# Patient Record
Sex: MALE | Race: WHITE | NOT HISPANIC OR LATINO | URBAN - METROPOLITAN AREA
[De-identification: names, ages, dates, MRNs, and addresses within clinical notes are randomized per-mention and may not be internally consistent; named-entity substitution may affect disease eponyms.]

---

## 2022-10-10 ENCOUNTER — EMERGENCY (EMERGENCY)
Facility: HOSPITAL | Age: 42
LOS: 0 days | Discharge: HOME | End: 2022-10-10
Attending: STUDENT IN AN ORGANIZED HEALTH CARE EDUCATION/TRAINING PROGRAM

## 2022-10-10 VITALS
RESPIRATION RATE: 18 BRPM | TEMPERATURE: 99 F | DIASTOLIC BLOOD PRESSURE: 91 MMHG | OXYGEN SATURATION: 98 % | HEART RATE: 120 BPM | SYSTOLIC BLOOD PRESSURE: 160 MMHG | WEIGHT: 199.96 LBS

## 2022-10-10 VITALS — HEART RATE: 98 BPM

## 2022-10-10 DIAGNOSIS — R09.89 OTHER SPECIFIED SYMPTOMS AND SIGNS INVOLVING THE CIRCULATORY AND RESPIRATORY SYSTEMS: ICD-10-CM

## 2022-10-10 DIAGNOSIS — Z20.822 CONTACT WITH AND (SUSPECTED) EXPOSURE TO COVID-19: ICD-10-CM

## 2022-10-10 DIAGNOSIS — R11.10 VOMITING, UNSPECIFIED: ICD-10-CM

## 2022-10-10 LAB
ALBUMIN SERPL ELPH-MCNC: 5.4 G/DL — HIGH (ref 3.5–5.2)
ALP SERPL-CCNC: 63 U/L — SIGNIFICANT CHANGE UP (ref 30–115)
ALT FLD-CCNC: 24 U/L — SIGNIFICANT CHANGE UP (ref 0–41)
ANION GAP SERPL CALC-SCNC: 11 MMOL/L — SIGNIFICANT CHANGE UP (ref 7–14)
APTT BLD: 29.7 SEC — SIGNIFICANT CHANGE UP (ref 27–39.2)
AST SERPL-CCNC: 18 U/L — SIGNIFICANT CHANGE UP (ref 0–41)
BASOPHILS # BLD AUTO: 0.06 K/UL — SIGNIFICANT CHANGE UP (ref 0–0.2)
BASOPHILS NFR BLD AUTO: 0.5 % — SIGNIFICANT CHANGE UP (ref 0–1)
BILIRUB SERPL-MCNC: 0.4 MG/DL — SIGNIFICANT CHANGE UP (ref 0.2–1.2)
BLD GP AB SCN SERPL QL: SIGNIFICANT CHANGE UP
BUN SERPL-MCNC: 16 MG/DL — SIGNIFICANT CHANGE UP (ref 10–20)
CALCIUM SERPL-MCNC: 9.7 MG/DL — SIGNIFICANT CHANGE UP (ref 8.4–10.5)
CHLORIDE SERPL-SCNC: 104 MMOL/L — SIGNIFICANT CHANGE UP (ref 98–110)
CO2 SERPL-SCNC: 25 MMOL/L — SIGNIFICANT CHANGE UP (ref 17–32)
CREAT SERPL-MCNC: 0.9 MG/DL — SIGNIFICANT CHANGE UP (ref 0.7–1.5)
EGFR: 111 ML/MIN/1.73M2 — SIGNIFICANT CHANGE UP
EOSINOPHIL # BLD AUTO: 0.06 K/UL — SIGNIFICANT CHANGE UP (ref 0–0.7)
EOSINOPHIL NFR BLD AUTO: 0.5 % — SIGNIFICANT CHANGE UP (ref 0–8)
GLUCOSE SERPL-MCNC: 116 MG/DL — HIGH (ref 70–99)
HCT VFR BLD CALC: 46.9 % — SIGNIFICANT CHANGE UP (ref 42–52)
HGB BLD-MCNC: 16.4 G/DL — SIGNIFICANT CHANGE UP (ref 14–18)
IMM GRANULOCYTES NFR BLD AUTO: 0.3 % — SIGNIFICANT CHANGE UP (ref 0.1–0.3)
INR BLD: 0.97 RATIO — SIGNIFICANT CHANGE UP (ref 0.65–1.3)
LIDOCAIN IGE QN: 22 U/L — SIGNIFICANT CHANGE UP (ref 7–60)
LYMPHOCYTES # BLD AUTO: 1.7 K/UL — SIGNIFICANT CHANGE UP (ref 1.2–3.4)
LYMPHOCYTES # BLD AUTO: 13.9 % — LOW (ref 20.5–51.1)
MCHC RBC-ENTMCNC: 29.4 PG — SIGNIFICANT CHANGE UP (ref 27–31)
MCHC RBC-ENTMCNC: 35 G/DL — SIGNIFICANT CHANGE UP (ref 32–37)
MCV RBC AUTO: 84.1 FL — SIGNIFICANT CHANGE UP (ref 80–94)
MONOCYTES # BLD AUTO: 0.52 K/UL — SIGNIFICANT CHANGE UP (ref 0.1–0.6)
MONOCYTES NFR BLD AUTO: 4.3 % — SIGNIFICANT CHANGE UP (ref 1.7–9.3)
NEUTROPHILS # BLD AUTO: 9.85 K/UL — HIGH (ref 1.4–6.5)
NEUTROPHILS NFR BLD AUTO: 80.5 % — HIGH (ref 42.2–75.2)
NRBC # BLD: 0 /100 WBCS — SIGNIFICANT CHANGE UP (ref 0–0)
PLATELET # BLD AUTO: 333 K/UL — SIGNIFICANT CHANGE UP (ref 130–400)
POTASSIUM SERPL-MCNC: 4.8 MMOL/L — SIGNIFICANT CHANGE UP (ref 3.5–5)
POTASSIUM SERPL-SCNC: 4.8 MMOL/L — SIGNIFICANT CHANGE UP (ref 3.5–5)
PROT SERPL-MCNC: 8 G/DL — SIGNIFICANT CHANGE UP (ref 6–8)
PROTHROM AB SERPL-ACNC: 11.1 SEC — SIGNIFICANT CHANGE UP (ref 9.95–12.87)
RBC # BLD: 5.58 M/UL — SIGNIFICANT CHANGE UP (ref 4.7–6.1)
RBC # FLD: 12.5 % — SIGNIFICANT CHANGE UP (ref 11.5–14.5)
SARS-COV-2 RNA SPEC QL NAA+PROBE: SIGNIFICANT CHANGE UP
SODIUM SERPL-SCNC: 140 MMOL/L — SIGNIFICANT CHANGE UP (ref 135–146)
WBC # BLD: 12.23 K/UL — HIGH (ref 4.8–10.8)
WBC # FLD AUTO: 12.23 K/UL — HIGH (ref 4.8–10.8)

## 2022-10-10 PROCEDURE — 99284 EMERGENCY DEPT VISIT MOD MDM: CPT

## 2022-10-10 RX ORDER — ONDANSETRON 8 MG/1
4 TABLET, FILM COATED ORAL ONCE
Refills: 0 | Status: COMPLETED | OUTPATIENT
Start: 2022-10-10 | End: 2022-10-10

## 2022-10-10 RX ORDER — GLUCAGON INJECTION, SOLUTION 0.5 MG/.1ML
2 INJECTION, SOLUTION SUBCUTANEOUS ONCE
Refills: 0 | Status: COMPLETED | OUTPATIENT
Start: 2022-10-10 | End: 2022-10-10

## 2022-10-10 RX ORDER — GLUCAGON INJECTION, SOLUTION 0.5 MG/.1ML
2 INJECTION, SOLUTION SUBCUTANEOUS ONCE
Refills: 0 | Status: DISCONTINUED | OUTPATIENT
Start: 2022-10-10 | End: 2022-10-10

## 2022-10-10 RX ORDER — SODIUM CHLORIDE 9 MG/ML
1000 INJECTION, SOLUTION INTRAVENOUS ONCE
Refills: 0 | Status: COMPLETED | OUTPATIENT
Start: 2022-10-10 | End: 2022-10-10

## 2022-10-10 RX ADMIN — ONDANSETRON 4 MILLIGRAM(S): 8 TABLET, FILM COATED ORAL at 17:21

## 2022-10-10 RX ADMIN — SODIUM CHLORIDE 1000 MILLILITER(S): 9 INJECTION, SOLUTION INTRAVENOUS at 17:20

## 2022-10-10 RX ADMIN — GLUCAGON INJECTION, SOLUTION 2 MILLIGRAM(S): 0.5 INJECTION, SOLUTION SUBCUTANEOUS at 18:54

## 2022-10-10 RX ADMIN — GLUCAGON INJECTION, SOLUTION 2 MILLIGRAM(S): 0.5 INJECTION, SOLUTION SUBCUTANEOUS at 17:20

## 2022-10-10 NOTE — ED PROVIDER NOTE - NS ED ATTENDING STATEMENT MOD
This was a shared visit with the BERONICA. I reviewed and verified the documentation and independently performed the documented:

## 2022-10-10 NOTE — ED PROVIDER NOTE - PROGRESS NOTE DETAILS
FF: pt given soda and jumped up and down without relief. pt given 2mg of iv glucagon without relief. I spoke with gi fellow who reports cna give pt another 2mg of glucagon wait 15 minutes and then if that fails give another 2mg of glucagon iv and if that fails then they will have to do endoscopy. after second dose of 2mg of iv glucagon pt reports he felt the piece of food stuck move. pt appears comfortable and is tolerating po. pt drank a bottle of smart water since then. pt advised of strict return precautions discussed at bedside. agreeable to dc f/u with pcp.

## 2022-10-10 NOTE — ED ADULT NURSE NOTE - OBJECTIVE STATEMENT
pt c/o of feeling like a piece of chicken is stuck in his throat for about 4 hours - no difficulty breathing speaking in clear sentences

## 2022-10-10 NOTE — ED PROVIDER NOTE - CARE PLAN
1 Principal Discharge DX:	Sensation of foreign body in throat   Principal Discharge DX:	Sensation of foreign body in throat  Assessment and plan of treatment:	plan - carbonated drink reassess, glucagon, gi

## 2022-10-10 NOTE — ED PROVIDER NOTE - NS ED ROS FT
CONST: No fever, chills or bodyaches  EYES: No pain, redness, drainage or visual changes.  ENT: No ear pain or discharge, nasal discharge or congestion. No sore throat. (+) sensation of food stuck in the throat.   CARD: No chest pain, palpitations  RESP: No SOB, cough, hemoptysis. No hx of asthma or COPD  GI: No abdominal pain, N/V/D  : No urinary symptoms  MS: No joint pain, back pain or extremity pain/injury  SKIN: No rashes  NEURO: No headache, dizziness, paresthesias or LOC

## 2022-10-10 NOTE — ED PROVIDER NOTE - OBJECTIVE STATEMENT
40 y/o male with no significant PMH presents to the ED for evaluation of piece of chicken stuck in his chest. pt reports he was eating boneless chicken earlier today and feels like it is now stuck in his chest. pt reports he was vomiting some pieces of chicken at home but still feels it is stuck in his chest and was not tolerating po at home. pt reports pain in his chest with swallowing. pt reports in the past he has had food get stuck in his throat and reports he eats very quickly. pt has not seen a GI specialist yet. pt denies fever, chills, sob, drooling, change in voice, cough, abdominal pain, n/v/d/c, or weakness.

## 2022-10-10 NOTE — ED PROVIDER NOTE - ATTENDING APP SHARED VISIT CONTRIBUTION OF CARE
40 y/o M no reported PMHx presents to ED reporting he feels there a piece of chicken thigh stuck in his chest. Reports foreign body sensation, no vomiting, no drooling. Pt reporting difficulty swallowing at this time.     No fever, sob, palpitations, diaphoresis, cough, headache, dizziness, numbness/tingling, neck pain/stiffness, abdominal pain, diarrhea, constipation, urinary symptoms, trauma, weakness, edema, calf pain or swelling, sick contacts, recent travel or rash.     Vital Signs: I have reviewed the initial vital signs.  Constitutional: Patient in no acute distress.  Integumentary: No rash.  ENT: MMM no FB in pharynx visualized, no pharyngeal erythema or edema   NECK: Supple.   Cardiovascular: RRR, radial pulses 2/4 bilaterally.   Respiratory: Breath sounds CTA b/l, no wheezing or crackles, good air exchange, good resp effort and excursion, no accessory muscle use, no stridor.  Gastrointestinal: Abdomen soft, non-tender, non-distended, no rebound tenderness or guarding, no CVAT.   Musculoskeletal: FROM, no edema, no calf pain/swelling/erythema.  Neurologic: Awake and alert, no FND, follows commands.

## 2022-10-10 NOTE — ED PROVIDER NOTE - CLINICAL SUMMARY MEDICAL DECISION MAKING FREE TEXT BOX
42 y/o M presented w/ foreign body sensation to throat/chest after eating chicken. Pt given carbonated drink and 2mg glucagon IV x2, GI aware of pt, after second dose of glucagon pt able to swallow the piece of stuck food w/ full improvement of sx. Pt able to tolerate PO at this time. Patient to be discharged from ED. Any available test results were discussed with patient and/or family. Verbal instructions given, including instructions to return to ED immediately for any new, worsening, or concerning symptoms. Patient endorsed understanding. Written discharge instructions additionally given, including follow-up plan.

## 2022-10-10 NOTE — ED PROVIDER NOTE - PHYSICAL EXAMINATION
Physical Exam    Vital Signs: I have reviewed the initial vital signs.  Constitutional: well-nourished, appears stated age, no acute distress  Eyes: Conjunctiva pink, Sclera clear  ENT: Oropharynx is clear without lesions. uvula midline. no tonsillar erythema, edema, or exudates. no stridor. no pta. floor of the mouth is soft without pus. no trismus. no drooling. no tripoding.   Cardiovascular: S1 and S2, regular rate, regular rhythm, well-perfused extremities, radial pulses equal and 2+ b/l.   Respiratory: unlabored respiratory effort, clear to auscultation bilaterally no wheezing, rales and rhonchi. pt is speaking full sentences. no accessory muscle use.   Gastrointestinal: soft, non-tender, nondistended abdomen, no pulsatile mass, normal bowl sounds, no rebound, no guarding  Musculoskeletal: supple neck, FROM of b/l upper and lower extremities.   Integumentary: warm, dry, no rash  Neurologic: awake, alert, cranial nerves II-XII grossly intact, extremities’ motor and sensory functions grossly intact. steady gait.   Psychiatric: appropriate mood, appropriate affect

## 2022-10-10 NOTE — ED PROVIDER NOTE - NSFOLLOWUPINSTRUCTIONS_ED_ALL_ED_FT
Esophageal Foreign Body    WHAT YOU NEED TO KNOW:    What is esophageal foreign body? Esophageal foreign body is an object you swallowed that got stuck in your esophagus (throat). Examples include dental work and button batteries. A piece of food or a fish bone can also become stuck in your esophagus.     What increases my risk for esophageal foreign body? Your risk increases if you wear dentures, have trouble swallowing, or have a narrow esophagus. You also have a higher risk if you eat fish that contains small bones.    What are the signs and symptoms of esophageal foreign body?     Pain when you swallow, difficulty swallowing, or a sore throat      Drooling or vomiting       Choking or gagging       Chest pain, abdominal pain, or a feeling that something is in your throat       A cough or noisy breathing     How is esophageal foreign body diagnosed? Your healthcare provider will examine your throat, chest, and abdomen. Tell him what type of object you swallowed and when you swallowed it. Your healthcare provider may use any of the following to find the object:     A barium swallow or other x-rays may be used to check your neck, chest, and abdomen. You will drink thick liquid called barium while healthcare providers take x-rays. Barium helps your esophagus and stomach show up on x-rays.       Laryngoscopy is used to examine the back of your throat. Your healthcare provider will use a light and a mirror. He may insert a tube called a scope to see deep into your throat.       A metal detector may be used to look for coins or other metal objects in your body.       A CT may be used to check for objects in your esophagus or stomach. You may be given contrast liquid to help your esophagus and stomach show up better. Tell the healthcare provider if you have ever had an allergic reaction to contrast liquid.       Endoscopy may be used to see the inside of your digestive system. A scope is a long, bendable tube with a light on the end of it. A camera attached to the scope will take pictures.     How is esophageal foreign body treated? Your healthcare provider may choose to observe you for 24 hours or longer. Most objects pass through the digestive system on their own within 7 to 10 days. Objects that are small or smooth will often pass without a problem. You will need to search for the object every time you have a bowel movement. You may need x-rays from time to time as you wait for the object to come out. If you are in pain or the object is large or sharp, your healthcare provider may remove it. He will look for the object in your throat. He will remove the object if he can see it. He may need to use instruments if it is stuck so far down that he cannot see it. He may do this with any of the following:     Endoscopy may be used to remove the object.       Forceps may be used to grab an object if your healthcare provider can see it in the back of your throat. Forceps also may be used to remove an object during endoscopy.       Bougienage is a procedure used to push the object into your stomach. Your healthcare provider will insert a thin tube into your esophagus to widen it. This may be done if the object is smooth and likely to pass through your digestive system normally.       A balloon catheter may be used to pull the object out of your esophagus. The catheter is a thin tube with a deflated balloon at the end. Your healthcare provider will insert the balloon catheter into your mouth or nose until it goes past the object. He will then inflate the balloon. This procedure may be done if the object is smooth or blunt.       Surgery may be needed if other treatments fail to remove the object.     Call 911 for any of the following:     You have chest or abdominal pain, or shortness of breath.       You are choking.    When should I seek immediate care?     You have a fever.      You have more pain when you swallow.       You have severe vomiting.       Your vomit is bloody.      Your bowel movements are black or bloody.     When should I contact my healthcare provider?     You do not find the object in your bowel movement within 2 or 3 days.      You have questions or concerns about your condition or care.    CARE AGREEMENT:    You have the right to help plan your care. Learn about your health condition and how it may be treated. Discuss treatment options with your healthcare providers to decide what care you want to receive. You always have the right to refuse treatment.

## 2022-10-10 NOTE — ED PROVIDER NOTE - CARE PROVIDER_API CALL
Orlando Velasquez (DO)  Gastroenterology  [address] [place] [id number]  Phone: [phone]  Fax: [phone]  Follow Up Time: 7-10 Days

## 2023-01-23 ENCOUNTER — OUTPATIENT (OUTPATIENT)
Dept: OUTPATIENT SERVICES | Facility: HOSPITAL | Age: 43
LOS: 1 days | Discharge: HOME | End: 2023-01-23
Payer: COMMERCIAL

## 2023-01-23 DIAGNOSIS — R13.10 DYSPHAGIA, UNSPECIFIED: ICD-10-CM

## 2023-01-23 PROCEDURE — 74240 X-RAY XM UPR GI TRC 1CNTRST: CPT | Mod: 26

## 2023-11-18 ENCOUNTER — EMERGENCY (EMERGENCY)
Facility: HOSPITAL | Age: 43
LOS: 0 days | Discharge: HOME | End: 2023-11-18
Attending: EMERGENCY MEDICINE
Payer: COMMERCIAL

## 2023-11-18 VITALS
RESPIRATION RATE: 19 BRPM | OXYGEN SATURATION: 96 % | HEART RATE: 103 BPM | WEIGHT: 184.97 LBS | DIASTOLIC BLOOD PRESSURE: 108 MMHG | SYSTOLIC BLOOD PRESSURE: 168 MMHG | TEMPERATURE: 99 F

## 2023-11-18 DIAGNOSIS — J18.9 PNEUMONIA, UNSPECIFIED ORGANISM: ICD-10-CM

## 2023-11-18 LAB
ALBUMIN SERPL ELPH-MCNC: 4.6 G/DL — SIGNIFICANT CHANGE UP (ref 3.5–5.2)
ALBUMIN SERPL ELPH-MCNC: 4.6 G/DL — SIGNIFICANT CHANGE UP (ref 3.5–5.2)
ALP SERPL-CCNC: 75 U/L — SIGNIFICANT CHANGE UP (ref 30–115)
ALP SERPL-CCNC: 75 U/L — SIGNIFICANT CHANGE UP (ref 30–115)
ALT FLD-CCNC: 12 U/L — SIGNIFICANT CHANGE UP (ref 0–41)
ALT FLD-CCNC: 12 U/L — SIGNIFICANT CHANGE UP (ref 0–41)
ANION GAP SERPL CALC-SCNC: 12 MMOL/L — SIGNIFICANT CHANGE UP (ref 7–14)
ANION GAP SERPL CALC-SCNC: 12 MMOL/L — SIGNIFICANT CHANGE UP (ref 7–14)
AST SERPL-CCNC: 14 U/L — SIGNIFICANT CHANGE UP (ref 0–41)
AST SERPL-CCNC: 14 U/L — SIGNIFICANT CHANGE UP (ref 0–41)
BASOPHILS # BLD AUTO: 0.07 K/UL — SIGNIFICANT CHANGE UP (ref 0–0.2)
BASOPHILS # BLD AUTO: 0.07 K/UL — SIGNIFICANT CHANGE UP (ref 0–0.2)
BASOPHILS NFR BLD AUTO: 0.9 % — SIGNIFICANT CHANGE UP (ref 0–1)
BASOPHILS NFR BLD AUTO: 0.9 % — SIGNIFICANT CHANGE UP (ref 0–1)
BILIRUB SERPL-MCNC: 0.3 MG/DL — SIGNIFICANT CHANGE UP (ref 0.2–1.2)
BILIRUB SERPL-MCNC: 0.3 MG/DL — SIGNIFICANT CHANGE UP (ref 0.2–1.2)
BUN SERPL-MCNC: 15 MG/DL — SIGNIFICANT CHANGE UP (ref 10–20)
BUN SERPL-MCNC: 15 MG/DL — SIGNIFICANT CHANGE UP (ref 10–20)
CALCIUM SERPL-MCNC: 9.8 MG/DL — SIGNIFICANT CHANGE UP (ref 8.4–10.5)
CALCIUM SERPL-MCNC: 9.8 MG/DL — SIGNIFICANT CHANGE UP (ref 8.4–10.5)
CHLORIDE SERPL-SCNC: 105 MMOL/L — SIGNIFICANT CHANGE UP (ref 98–110)
CHLORIDE SERPL-SCNC: 105 MMOL/L — SIGNIFICANT CHANGE UP (ref 98–110)
CO2 SERPL-SCNC: 25 MMOL/L — SIGNIFICANT CHANGE UP (ref 17–32)
CO2 SERPL-SCNC: 25 MMOL/L — SIGNIFICANT CHANGE UP (ref 17–32)
CREAT SERPL-MCNC: 1 MG/DL — SIGNIFICANT CHANGE UP (ref 0.7–1.5)
CREAT SERPL-MCNC: 1 MG/DL — SIGNIFICANT CHANGE UP (ref 0.7–1.5)
EGFR: 98 ML/MIN/1.73M2 — SIGNIFICANT CHANGE UP
EGFR: 98 ML/MIN/1.73M2 — SIGNIFICANT CHANGE UP
EOSINOPHIL # BLD AUTO: 0.62 K/UL — SIGNIFICANT CHANGE UP (ref 0–0.7)
EOSINOPHIL # BLD AUTO: 0.62 K/UL — SIGNIFICANT CHANGE UP (ref 0–0.7)
EOSINOPHIL NFR BLD AUTO: 8.3 % — HIGH (ref 0–8)
EOSINOPHIL NFR BLD AUTO: 8.3 % — HIGH (ref 0–8)
GLUCOSE SERPL-MCNC: 93 MG/DL — SIGNIFICANT CHANGE UP (ref 70–99)
GLUCOSE SERPL-MCNC: 93 MG/DL — SIGNIFICANT CHANGE UP (ref 70–99)
HCT VFR BLD CALC: 41.8 % — LOW (ref 42–52)
HCT VFR BLD CALC: 41.8 % — LOW (ref 42–52)
HGB BLD-MCNC: 15.3 G/DL — SIGNIFICANT CHANGE UP (ref 14–18)
HGB BLD-MCNC: 15.3 G/DL — SIGNIFICANT CHANGE UP (ref 14–18)
IMM GRANULOCYTES NFR BLD AUTO: 0.4 % — HIGH (ref 0.1–0.3)
IMM GRANULOCYTES NFR BLD AUTO: 0.4 % — HIGH (ref 0.1–0.3)
LACTATE SERPL-SCNC: 1.5 MMOL/L — SIGNIFICANT CHANGE UP (ref 0.7–2)
LACTATE SERPL-SCNC: 1.5 MMOL/L — SIGNIFICANT CHANGE UP (ref 0.7–2)
LYMPHOCYTES # BLD AUTO: 2.23 K/UL — SIGNIFICANT CHANGE UP (ref 1.2–3.4)
LYMPHOCYTES # BLD AUTO: 2.23 K/UL — SIGNIFICANT CHANGE UP (ref 1.2–3.4)
LYMPHOCYTES # BLD AUTO: 29.9 % — SIGNIFICANT CHANGE UP (ref 20.5–51.1)
LYMPHOCYTES # BLD AUTO: 29.9 % — SIGNIFICANT CHANGE UP (ref 20.5–51.1)
MCHC RBC-ENTMCNC: 32.3 PG — HIGH (ref 27–31)
MCHC RBC-ENTMCNC: 32.3 PG — HIGH (ref 27–31)
MCHC RBC-ENTMCNC: 36.6 G/DL — SIGNIFICANT CHANGE UP (ref 32–37)
MCHC RBC-ENTMCNC: 36.6 G/DL — SIGNIFICANT CHANGE UP (ref 32–37)
MCV RBC AUTO: 88.2 FL — SIGNIFICANT CHANGE UP (ref 80–94)
MCV RBC AUTO: 88.2 FL — SIGNIFICANT CHANGE UP (ref 80–94)
MONOCYTES # BLD AUTO: 0.63 K/UL — HIGH (ref 0.1–0.6)
MONOCYTES # BLD AUTO: 0.63 K/UL — HIGH (ref 0.1–0.6)
MONOCYTES NFR BLD AUTO: 8.4 % — SIGNIFICANT CHANGE UP (ref 1.7–9.3)
MONOCYTES NFR BLD AUTO: 8.4 % — SIGNIFICANT CHANGE UP (ref 1.7–9.3)
NEUTROPHILS # BLD AUTO: 3.89 K/UL — SIGNIFICANT CHANGE UP (ref 1.4–6.5)
NEUTROPHILS # BLD AUTO: 3.89 K/UL — SIGNIFICANT CHANGE UP (ref 1.4–6.5)
NEUTROPHILS NFR BLD AUTO: 52.1 % — SIGNIFICANT CHANGE UP (ref 42.2–75.2)
NEUTROPHILS NFR BLD AUTO: 52.1 % — SIGNIFICANT CHANGE UP (ref 42.2–75.2)
NRBC # BLD: 0 /100 WBCS — SIGNIFICANT CHANGE UP (ref 0–0)
NRBC # BLD: 0 /100 WBCS — SIGNIFICANT CHANGE UP (ref 0–0)
PLATELET # BLD AUTO: 434 K/UL — HIGH (ref 130–400)
PLATELET # BLD AUTO: 434 K/UL — HIGH (ref 130–400)
PMV BLD: 9 FL — SIGNIFICANT CHANGE UP (ref 7.4–10.4)
PMV BLD: 9 FL — SIGNIFICANT CHANGE UP (ref 7.4–10.4)
POTASSIUM SERPL-MCNC: 4.2 MMOL/L — SIGNIFICANT CHANGE UP (ref 3.5–5)
POTASSIUM SERPL-MCNC: 4.2 MMOL/L — SIGNIFICANT CHANGE UP (ref 3.5–5)
POTASSIUM SERPL-SCNC: 4.2 MMOL/L — SIGNIFICANT CHANGE UP (ref 3.5–5)
POTASSIUM SERPL-SCNC: 4.2 MMOL/L — SIGNIFICANT CHANGE UP (ref 3.5–5)
PROT SERPL-MCNC: 7.2 G/DL — SIGNIFICANT CHANGE UP (ref 6–8)
PROT SERPL-MCNC: 7.2 G/DL — SIGNIFICANT CHANGE UP (ref 6–8)
RBC # BLD: 4.74 M/UL — SIGNIFICANT CHANGE UP (ref 4.7–6.1)
RBC # BLD: 4.74 M/UL — SIGNIFICANT CHANGE UP (ref 4.7–6.1)
RBC # FLD: 12.2 % — SIGNIFICANT CHANGE UP (ref 11.5–14.5)
RBC # FLD: 12.2 % — SIGNIFICANT CHANGE UP (ref 11.5–14.5)
SODIUM SERPL-SCNC: 142 MMOL/L — SIGNIFICANT CHANGE UP (ref 135–146)
SODIUM SERPL-SCNC: 142 MMOL/L — SIGNIFICANT CHANGE UP (ref 135–146)
WBC # BLD: 7.47 K/UL — SIGNIFICANT CHANGE UP (ref 4.8–10.8)
WBC # BLD: 7.47 K/UL — SIGNIFICANT CHANGE UP (ref 4.8–10.8)
WBC # FLD AUTO: 7.47 K/UL — SIGNIFICANT CHANGE UP (ref 4.8–10.8)
WBC # FLD AUTO: 7.47 K/UL — SIGNIFICANT CHANGE UP (ref 4.8–10.8)

## 2023-11-18 PROCEDURE — 83605 ASSAY OF LACTIC ACID: CPT

## 2023-11-18 PROCEDURE — 99283 EMERGENCY DEPT VISIT LOW MDM: CPT

## 2023-11-18 PROCEDURE — 80053 COMPREHEN METABOLIC PANEL: CPT

## 2023-11-18 PROCEDURE — 36415 COLL VENOUS BLD VENIPUNCTURE: CPT

## 2023-11-18 PROCEDURE — 99284 EMERGENCY DEPT VISIT MOD MDM: CPT

## 2023-11-18 PROCEDURE — 85025 COMPLETE CBC W/AUTO DIFF WBC: CPT

## 2023-11-18 RX ORDER — AZITHROMYCIN 500 MG/1
1 TABLET, FILM COATED ORAL
Qty: 1 | Refills: 0
Start: 2023-11-18 | End: 2023-11-22

## 2023-11-18 NOTE — ED PROVIDER NOTE - OBJECTIVE STATEMENT
42-year-old male with no past medical history presenting for evaluation of pneumonia.  Patient was recently diagnosed with pneumonia 1 week ago. Patient completed course of doxycycline yesterday. Patient states that he has been feeling better over the last week. [place] [facility] [place] and was advised to come to ER for further evaluation for pneumonia on cxr. Patient no longer has any cp, sob, fevers, or chills.

## 2023-11-18 NOTE — ED PROVIDER NOTE - MDM PATIENT STATEMENT FOR ADDL TREATMENT
Patient with one or more new problems requiring additional work-up/treatment. gait, locomotion, and balance/muscle strength

## 2023-11-18 NOTE — ED PROVIDER NOTE - ATTENDING APP SHARED VISIT CONTRIBUTION OF CARE
42-year-old male, took doxycycline for pneumonia, seen in urgent care today and told to come to ED because his repeat x-ray still has pneumonia.  Otherwise he feels better compared to previous.  Exam shows alert patient in no distress, HEENT NCAT PERRL, neck supple, throat no exudates, lungs rales left base, RR S1S2, abdomen soft NT +BS, no CCE.

## 2023-11-18 NOTE — ED PROVIDER NOTE - PHYSICAL EXAMINATION
VITAL SIGNS: I have reviewed nursing notes and confirm.  CONSTITUTIONAL: Patient is in no acute distress.  SKIN: Skin exam is warm and dry, no acute rash.  HEAD: Normocephalic; atraumatic.  EYES: PERRL, EOM intact; conjunctiva and sclera clear.  ENT: No nasal discharge; airway clear.   NECK: Supple; non tender.  CARD: S1, S2 normal; no murmurs, gallops, or rubs. Regular rate and rhythm.  RESP: +Rales bilaterally L>R. No respiratory distress.   ABD: Normal bowel sounds; soft; non-distended; non-tender.   EXT: Normal ROM. No edema.  LYMPH: No acute cervical adenopathy.  NEURO: Alert, oriented. Grossly unremarkable. No focal deficits.  PSYCH: Cooperative, appropriate.

## 2023-11-18 NOTE — ED PROVIDER NOTE - CLINICAL SUMMARY MEDICAL DECISION MAKING FREE TEXT BOX
42-year-old male, took doxycycline for pneumonia, here in ED DC was told his repeat chest x-ray still has pneumonia.  Otherwise he feels better.  X-ray of the chest reviewed which showed no opacities.  Labs unremarkable.  Will DC on Augmentin and azithromycin to follow-up with PCP.

## 2023-11-18 NOTE — ED ADULT NURSE NOTE - NSFALLUNIVINTERV_ED_ALL_ED
Bed/Stretcher in lowest position, wheels locked, appropriate side rails in place/Call bell, personal items and telephone in reach/Instruct patient to call for assistance before getting out of bed/chair/stretcher/Non-slip footwear applied when patient is off stretcher/[place] to call system/Physically safe environment - no spills, clutter or unnecessary equipment/Purposeful proactive rounding/Room/bathroom lighting operational, light cord in reach

## 2023-11-22 PROCEDURE — 88305 TISSUE EXAM BY PATHOLOGIST: CPT | Mod: 26

## 2023-11-22 PROCEDURE — 88312 SPECIAL STAINS GROUP 1: CPT | Mod: 26

## 2023-11-23 ENCOUNTER — INPATIENT (INPATIENT)
Facility: HOSPITAL | Age: 43
LOS: 0 days | Discharge: HOME | DRG: 393 | End: 2023-11-24
Attending: STUDENT IN AN ORGANIZED HEALTH CARE EDUCATION/TRAINING PROGRAM | Admitting: STUDENT IN AN ORGANIZED HEALTH CARE EDUCATION/TRAINING PROGRAM
Payer: COMMERCIAL

## 2023-11-23 VITALS
DIASTOLIC BLOOD PRESSURE: 87 MMHG | HEART RATE: 124 BPM | OXYGEN SATURATION: 96 % | RESPIRATION RATE: 18 BRPM | TEMPERATURE: 98 F | HEIGHT: 70 IN | SYSTOLIC BLOOD PRESSURE: 135 MMHG | WEIGHT: 184.97 LBS

## 2023-11-23 DIAGNOSIS — T18.128A FOOD IN ESOPHAGUS CAUSING OTHER INJURY, INITIAL ENCOUNTER: ICD-10-CM

## 2023-11-23 LAB
ALBUMIN SERPL ELPH-MCNC: 5 G/DL — SIGNIFICANT CHANGE UP (ref 3.5–5.2)
ALBUMIN SERPL ELPH-MCNC: 5 G/DL — SIGNIFICANT CHANGE UP (ref 3.5–5.2)
ALP SERPL-CCNC: 71 U/L — SIGNIFICANT CHANGE UP (ref 30–115)
ALP SERPL-CCNC: 71 U/L — SIGNIFICANT CHANGE UP (ref 30–115)
ALT FLD-CCNC: 19 U/L — SIGNIFICANT CHANGE UP (ref 0–41)
ALT FLD-CCNC: 19 U/L — SIGNIFICANT CHANGE UP (ref 0–41)
ANION GAP SERPL CALC-SCNC: 9 MMOL/L — SIGNIFICANT CHANGE UP (ref 7–14)
ANION GAP SERPL CALC-SCNC: 9 MMOL/L — SIGNIFICANT CHANGE UP (ref 7–14)
APTT BLD: 31 SEC — SIGNIFICANT CHANGE UP (ref 27–39.2)
APTT BLD: 31 SEC — SIGNIFICANT CHANGE UP (ref 27–39.2)
AST SERPL-CCNC: 20 U/L — SIGNIFICANT CHANGE UP (ref 0–41)
AST SERPL-CCNC: 20 U/L — SIGNIFICANT CHANGE UP (ref 0–41)
BILIRUB SERPL-MCNC: 0.4 MG/DL — SIGNIFICANT CHANGE UP (ref 0.2–1.2)
BILIRUB SERPL-MCNC: 0.4 MG/DL — SIGNIFICANT CHANGE UP (ref 0.2–1.2)
BUN SERPL-MCNC: 19 MG/DL — SIGNIFICANT CHANGE UP (ref 10–20)
BUN SERPL-MCNC: 19 MG/DL — SIGNIFICANT CHANGE UP (ref 10–20)
CALCIUM SERPL-MCNC: 10.4 MG/DL — SIGNIFICANT CHANGE UP (ref 8.4–10.4)
CALCIUM SERPL-MCNC: 10.4 MG/DL — SIGNIFICANT CHANGE UP (ref 8.4–10.4)
CHLORIDE SERPL-SCNC: 103 MMOL/L — SIGNIFICANT CHANGE UP (ref 98–110)
CHLORIDE SERPL-SCNC: 103 MMOL/L — SIGNIFICANT CHANGE UP (ref 98–110)
CO2 SERPL-SCNC: 27 MMOL/L — SIGNIFICANT CHANGE UP (ref 17–32)
CO2 SERPL-SCNC: 27 MMOL/L — SIGNIFICANT CHANGE UP (ref 17–32)
CREAT SERPL-MCNC: 1.1 MG/DL — SIGNIFICANT CHANGE UP (ref 0.7–1.5)
CREAT SERPL-MCNC: 1.1 MG/DL — SIGNIFICANT CHANGE UP (ref 0.7–1.5)
EGFR: 87 ML/MIN/1.73M2 — SIGNIFICANT CHANGE UP
EGFR: 87 ML/MIN/1.73M2 — SIGNIFICANT CHANGE UP
GLUCOSE SERPL-MCNC: 86 MG/DL — SIGNIFICANT CHANGE UP (ref 70–99)
GLUCOSE SERPL-MCNC: 86 MG/DL — SIGNIFICANT CHANGE UP (ref 70–99)
HCT VFR BLD CALC: 44.9 % — SIGNIFICANT CHANGE UP (ref 42–52)
HCT VFR BLD CALC: 44.9 % — SIGNIFICANT CHANGE UP (ref 42–52)
HGB BLD-MCNC: 15.8 G/DL — SIGNIFICANT CHANGE UP (ref 14–18)
HGB BLD-MCNC: 15.8 G/DL — SIGNIFICANT CHANGE UP (ref 14–18)
INR BLD: 1.02 RATIO — SIGNIFICANT CHANGE UP (ref 0.65–1.3)
INR BLD: 1.02 RATIO — SIGNIFICANT CHANGE UP (ref 0.65–1.3)
MCHC RBC-ENTMCNC: 30.8 PG — SIGNIFICANT CHANGE UP (ref 27–31)
MCHC RBC-ENTMCNC: 30.8 PG — SIGNIFICANT CHANGE UP (ref 27–31)
MCHC RBC-ENTMCNC: 35.2 G/DL — SIGNIFICANT CHANGE UP (ref 32–37)
MCHC RBC-ENTMCNC: 35.2 G/DL — SIGNIFICANT CHANGE UP (ref 32–37)
MCV RBC AUTO: 87.5 FL — SIGNIFICANT CHANGE UP (ref 80–94)
MCV RBC AUTO: 87.5 FL — SIGNIFICANT CHANGE UP (ref 80–94)
NRBC # BLD: 0 /100 WBCS — SIGNIFICANT CHANGE UP (ref 0–0)
NRBC # BLD: 0 /100 WBCS — SIGNIFICANT CHANGE UP (ref 0–0)
PLATELET # BLD AUTO: 404 K/UL — HIGH (ref 130–400)
PLATELET # BLD AUTO: 404 K/UL — HIGH (ref 130–400)
PMV BLD: 9.7 FL — SIGNIFICANT CHANGE UP (ref 7.4–10.4)
PMV BLD: 9.7 FL — SIGNIFICANT CHANGE UP (ref 7.4–10.4)
POTASSIUM SERPL-MCNC: 4.3 MMOL/L — SIGNIFICANT CHANGE UP (ref 3.5–5)
POTASSIUM SERPL-MCNC: 4.3 MMOL/L — SIGNIFICANT CHANGE UP (ref 3.5–5)
POTASSIUM SERPL-SCNC: 4.3 MMOL/L — SIGNIFICANT CHANGE UP (ref 3.5–5)
POTASSIUM SERPL-SCNC: 4.3 MMOL/L — SIGNIFICANT CHANGE UP (ref 3.5–5)
PROT SERPL-MCNC: 7.6 G/DL — SIGNIFICANT CHANGE UP (ref 6–8)
PROT SERPL-MCNC: 7.6 G/DL — SIGNIFICANT CHANGE UP (ref 6–8)
PROTHROM AB SERPL-ACNC: 11.6 SEC — SIGNIFICANT CHANGE UP (ref 9.95–12.87)
PROTHROM AB SERPL-ACNC: 11.6 SEC — SIGNIFICANT CHANGE UP (ref 9.95–12.87)
RBC # BLD: 5.13 M/UL — SIGNIFICANT CHANGE UP (ref 4.7–6.1)
RBC # BLD: 5.13 M/UL — SIGNIFICANT CHANGE UP (ref 4.7–6.1)
RBC # FLD: 12.4 % — SIGNIFICANT CHANGE UP (ref 11.5–14.5)
RBC # FLD: 12.4 % — SIGNIFICANT CHANGE UP (ref 11.5–14.5)
SODIUM SERPL-SCNC: 139 MMOL/L — SIGNIFICANT CHANGE UP (ref 135–146)
SODIUM SERPL-SCNC: 139 MMOL/L — SIGNIFICANT CHANGE UP (ref 135–146)
WBC # BLD: 18.73 K/UL — HIGH (ref 4.8–10.8)
WBC # BLD: 18.73 K/UL — HIGH (ref 4.8–10.8)
WBC # FLD AUTO: 18.73 K/UL — HIGH (ref 4.8–10.8)
WBC # FLD AUTO: 18.73 K/UL — HIGH (ref 4.8–10.8)

## 2023-11-23 PROCEDURE — 36415 COLL VENOUS BLD VENIPUNCTURE: CPT

## 2023-11-23 PROCEDURE — 99223 1ST HOSP IP/OBS HIGH 75: CPT

## 2023-11-23 PROCEDURE — 94640 AIRWAY INHALATION TREATMENT: CPT

## 2023-11-23 PROCEDURE — 71250 CT THORAX DX C-: CPT | Mod: 26

## 2023-11-23 PROCEDURE — 99285 EMERGENCY DEPT VISIT HI MDM: CPT

## 2023-11-23 PROCEDURE — 82962 GLUCOSE BLOOD TEST: CPT

## 2023-11-23 PROCEDURE — C9113: CPT

## 2023-11-23 PROCEDURE — 80053 COMPREHEN METABOLIC PANEL: CPT

## 2023-11-23 PROCEDURE — 71250 CT THORAX DX C-: CPT

## 2023-11-23 PROCEDURE — 99223 1ST HOSP IP/OBS HIGH 75: CPT | Mod: 25

## 2023-11-23 PROCEDURE — 43247 EGD REMOVE FOREIGN BODY: CPT

## 2023-11-23 PROCEDURE — 83735 ASSAY OF MAGNESIUM: CPT

## 2023-11-23 PROCEDURE — 85025 COMPLETE CBC W/AUTO DIFF WBC: CPT

## 2023-11-23 PROCEDURE — 70490 CT SOFT TISSUE NECK W/O DYE: CPT

## 2023-11-23 PROCEDURE — 70490 CT SOFT TISSUE NECK W/O DYE: CPT | Mod: 26

## 2023-11-23 PROCEDURE — 43239 EGD BIOPSY SINGLE/MULTIPLE: CPT | Mod: XU

## 2023-11-23 RX ORDER — ONDANSETRON 8 MG/1
4 TABLET, FILM COATED ORAL EVERY 8 HOURS
Refills: 0 | Status: DISCONTINUED | OUTPATIENT
Start: 2023-11-23 | End: 2023-11-24

## 2023-11-23 RX ORDER — LANOLIN ALCOHOL/MO/W.PET/CERES
3 CREAM (GRAM) TOPICAL AT BEDTIME
Refills: 0 | Status: DISCONTINUED | OUTPATIENT
Start: 2023-11-23 | End: 2023-11-24

## 2023-11-23 RX ORDER — MORPHINE SULFATE 50 MG/1
4 CAPSULE, EXTENDED RELEASE ORAL ONCE
Refills: 0 | Status: DISCONTINUED | OUTPATIENT
Start: 2023-11-23 | End: 2023-11-23

## 2023-11-23 RX ORDER — GLUCAGON INJECTION, SOLUTION 0.5 MG/.1ML
2 INJECTION, SOLUTION SUBCUTANEOUS ONCE
Refills: 0 | Status: COMPLETED | OUTPATIENT
Start: 2023-11-23 | End: 2023-11-23

## 2023-11-23 RX ORDER — ACETAMINOPHEN 500 MG
650 TABLET ORAL EVERY 6 HOURS
Refills: 0 | Status: DISCONTINUED | OUTPATIENT
Start: 2023-11-23 | End: 2023-11-24

## 2023-11-23 RX ORDER — NITROGLYCERIN 6.5 MG
0.4 CAPSULE, EXTENDED RELEASE ORAL ONCE
Refills: 0 | Status: COMPLETED | OUTPATIENT
Start: 2023-11-23 | End: 2023-11-23

## 2023-11-23 RX ORDER — ENOXAPARIN SODIUM 100 MG/ML
40 INJECTION SUBCUTANEOUS EVERY 24 HOURS
Refills: 0 | Status: DISCONTINUED | OUTPATIENT
Start: 2023-11-24 | End: 2023-11-24

## 2023-11-23 RX ORDER — PANTOPRAZOLE SODIUM 20 MG/1
40 TABLET, DELAYED RELEASE ORAL EVERY 12 HOURS
Refills: 0 | Status: DISCONTINUED | OUTPATIENT
Start: 2023-11-23 | End: 2023-11-24

## 2023-11-23 RX ORDER — IPRATROPIUM/ALBUTEROL SULFATE 18-103MCG
3 AEROSOL WITH ADAPTER (GRAM) INHALATION EVERY 6 HOURS
Refills: 0 | Status: DISCONTINUED | OUTPATIENT
Start: 2023-11-23 | End: 2023-11-24

## 2023-11-23 RX ORDER — SODIUM CHLORIDE 9 MG/ML
1000 INJECTION, SOLUTION INTRAVENOUS
Refills: 0 | Status: DISCONTINUED | OUTPATIENT
Start: 2023-11-23 | End: 2023-11-24

## 2023-11-23 RX ADMIN — GLUCAGON INJECTION, SOLUTION 2 MILLIGRAM(S): 0.5 INJECTION, SOLUTION SUBCUTANEOUS at 18:13

## 2023-11-23 RX ADMIN — Medication 0.4 MILLIGRAM(S): at 19:08

## 2023-11-23 RX ADMIN — GLUCAGON INJECTION, SOLUTION 2 MILLIGRAM(S): 0.5 INJECTION, SOLUTION SUBCUTANEOUS at 20:48

## 2023-11-23 RX ADMIN — MORPHINE SULFATE 4 MILLIGRAM(S): 50 CAPSULE, EXTENDED RELEASE ORAL at 19:34

## 2023-11-23 RX ADMIN — GLUCAGON INJECTION, SOLUTION 2 MILLIGRAM(S): 0.5 INJECTION, SOLUTION SUBCUTANEOUS at 18:38

## 2023-11-23 RX ADMIN — PANTOPRAZOLE SODIUM 40 MILLIGRAM(S): 20 TABLET, DELAYED RELEASE ORAL at 21:49

## 2023-11-23 NOTE — CHART NOTE - NSCHARTNOTEFT_GEN_A_CORE
- I contacted ED resident to administer a third dose of IV 2mg glucagon STAT   - In case of improvement, will hold off EGD and monitor overnight  - In case of no improvement, will perform EGD urgently

## 2023-11-23 NOTE — ED PROVIDER NOTE - OBJECTIVE STATEMENT
42-year-old male with no other past medical history presents for food bolus being stuck in his throat since 530.  Patient endorses this happening last year and did a for upper endoscopy work-up with CT scan which was unremarkable.  Patient was eating turkey today of note he which he admittedly was eating too fast and he noticed it got stuck in his throat.  At home patient tried to drink carbonated Coke and jump up and down to dislodge but was unsuccessful.  Patient has been able to control his secretions and has had no difficulty breathing and is able to speak complete sentences.

## 2023-11-23 NOTE — ED PROVIDER NOTE - CARE PLAN
1 Principal Discharge DX:	Foreign body sensation, throat   Principal Discharge DX:	Food impaction of esophagus

## 2023-11-23 NOTE — ED PROVIDER NOTE - CARE PROVIDER_API CALL
Seamus Crawford  Gastroenterology  [address] [place] [address]  Phone: [phone]  Fax: [phone]  Follow Up Time: 1-3 Days

## 2023-11-23 NOTE — H&P ADULT - HISTORY OF PRESENT ILLNESS
Pt is a 41 y/o M PMHx of gastritis and previous food impaction that resolved s/p glucagon injection who presented to the ED for evaluation of globus sensation after eating [place] at 3pm, GI consulted urgently in ED. Pt is s/p 3 glucagon injections. He has been regurgitating portions of food since admission.    In ED vitals: /87  (sinus tach) RR 18 T98F sat 96% on RA    Pt is protecting his airway.    Labs: WBC increased from 7 on admission to 18.7.    Admitted to medicine for urgent EGD Pt is a 41 y/o M PMHx of gastritis and previous food impaction that resolved s/p glucagon injection who presented to the ED for evaluation of globus sensation after eating [place] at 3pm, GI consulted urgently in ED. Pt is s/p 3 glucagon injections. He has been regurgitating portions of food since admission and c/o globus sensation. Denies any chest pain, abdominal pain. nausea, fevers, and chills.    In ED vitals: /87  (sinus tach) RR 18 T98F sat 96% on RA    Pt is protecting his airway.    Labs: WBC increased from 7 on admission to 18.7.    Admitted to medicine for urgent EGD

## 2023-11-23 NOTE — CHART NOTE - NSCHARTNOTEFT_GEN_A_CORE
Impressions:  - Corrugated mucosa and linear furrows in the whole esophagus compatible with esophagitis. (Biopsy).  - A food bolus, likely corresponding to the previously ingested [place], was noted at the distal aspect of the esophagus. With the help of minimal mechanical force using the endoscope and aggressive irrigation, the food bolus was successfully pushed into the stomach. A small mucosal tear was noted at the distal aspect of the esophagus, likely corresponding to sequela of dilation of a suspected esophageal ring. Some oozing of blood was initially noted at the site of the tear. After aggressive irrigation, no oozing of blood was noted.  - Erosions in the stomach compatible with erosive gastritis. (Biopsy).  - Normal mucosa in the whole examined duodenum.      RECOMMENDATIONS  - Await pathology to rule out eosinophilic esophagitis (EoE)  - Advance diet to clear liquid diet  - Switch to PO protonix 40mg BID  - Start carafate 1g QID  - Avoid NSAIDs  - Will need outpatient follow up with Dr Seamus Crawford for management of suspected EoE

## 2023-11-23 NOTE — ED ADULT TRIAGE NOTE - CHIEF COMPLAINT QUOTE
pt feels like there is a piece of food stuck in esophagus- states this has happened before - able to speak in full sentences denies difficulty breathing

## 2023-11-23 NOTE — CHART NOTE - NSCHARTNOTEFT_GEN_A_CORE
- In the setting of leukocytosis, tachycardia, and epigastric discomfort, the team was asked to perform STAT CT   - No pneumoperitoneum noted - In the setting of leukocytosis, tachycardia, and epigastric discomfort, the team was asked to perform STAT CT   - CT Neck Soft Tissue No Cont (11.23.23 @ 21:42) No acute abnormality, specifically no definite CT evidence of  pneumomediastinum or subcutaneous emphysema within the soft tissues of the neck. Please note that evaluation of the upper esophagus is limited on this exam due to motion artifact.  CT Chest No Cont (11.23.23 @ 21:43) There is food material seem at the GE junction. Food impaction possible.  - After re-evaluating the patient at bedside at 22:00 PM, he still reports epigastric discomfort with globus sensation and inability to tolerate liquids  - We explained need for intubation for procedure and he is agreeable  - Consent was obtained and patient being transported to PACU

## 2023-11-23 NOTE — H&P ADULT - ATTENDING COMMENTS
Patient seen and examined at bedside independently of the residents. I read the resident's note and agree with the plan with the additions and corrections as noted below. My note supersedes the resident's note.     REVIEW OF SYSTEMS:  Negative except in HPI.     PMH:  Gastritis, Food impaction (resolved with glucagon injection)    FHx: Reviewed. No fhx of asthma/copd, No fhx of liver and pulmonary disease. No fhx of hematological disorder.     Physical Exam:  GEN: No acute distress. Awake, Alert and oriented x 3.   Head: Atraumatic, Normocephalic.   Eye: PEERLA. No sclera icterus. EOMI.   ENT: Normal oropharynx, no thyromegaly, no mass, no lymphadenopathy.   LUNGS: Clear to auscultation bilaterally. No wheeze/rales/crackles.   HEART: Normal. S1/S2 present. RRR. No murmur/gallops.   ABD: Soft, non-tender, non-distended. Bowel sounds present.   EXT: No pitting edema. No erythema. No tenderness.  Integumentary: No rash, No sore, No petechia.   NEURO: CN III-XII intact. Strength: 5/5 b/l ULE. Sensory intact b/l ULE.     Vital Signs Last 24 Hrs  T(C): 36.7 (2023 17:32), Max: 36.7 (2023 17:32)  T(F): 98.1 (2023 17:32), Max: 98.1 (2023 17:32)  HR: 124 (2023 17:32) (124 - 124)  BP: 135/87 (2023 17:32) (135/87 - 135/87)  BP(mean): --  RR: 18 (2023 17:32) (18 - 18)  SpO2: 96% (2023 17:32) (96% - 96%)    Parameters below as of 2023 17:32  Patient On (Oxygen Delivery Method): room air      Please see the above notes for Labs and radiology.     Assessment and Plan:     43 yo M with hx of Gastritis, Food impaction (resolved with glucagon injection) presents to ED for evaluation of globus sensation after eating turkey at 3 pm today.     Food impaction   - s/p glucagon x 3 in ED.   - Currently patient is protecting airway and able to speak in full sentences.   - NPO with IVF gentle hydration   - PIP BID   - Urgent EGD today with GI  - f/u GI     Leukocytosis likely reactive  - CT chest: neg for acute pathology. No PTX or pneumomediastinum.   - No fever.   - monitor for fever curve for now.     Recent CAP   - CT chest resolution of PNA.   - patient is on Augmentin course. continue with Augmentin to finish the course.     DVT ppx: Lovenox SC  GI ppx:  PPI   Diet: NPO   Activity: as tolerated.     Date seen by the attendin2023  Total time spent: 75 minutes. Patient seen and examined at bedside independently of the residents. I read the resident's note and agree with the plan with the additions and corrections as noted below. My note supersedes the resident's note.     REVIEW OF SYSTEMS:  Negative except in HPI.     PMH:  Gastritis, Food impaction (resolved with glucagon injection)    FHx: Reviewed. No fhx of asthma/copd, No fhx of liver and pulmonary disease. No fhx of hematological disorder.     Physical Exam:  GEN: No acute distress. Awake, Alert and oriented x 3.   Head: Atraumatic, Normocephalic.   Eye: PEERLA. No sclera icterus. EOMI.   ENT: Normal oropharynx, no thyromegaly, no mass, no lymphadenopathy.   LUNGS: Clear to auscultation bilaterally. No wheeze/rales/crackles.   HEART: Normal. S1/S2 present. RRR. No murmur/gallops.   ABD: Soft, non-tender, non-distended. Bowel sounds present.   EXT: No pitting edema. No erythema. No tenderness.  Integumentary: No rash, No sore, No petechia.   NEURO: CN III-XII intact. Strength: 5/5 b/l ULE. Sensory intact b/l ULE.     Vital Signs Last 24 Hrs  T(C): 36.7 (2023 17:32), Max: 36.7 (2023 17:32)  T(F): 98.1 (2023 17:32), Max: 98.1 (2023 17:32)  HR: 124 (2023 17:32) (124 - 124)  BP: 135/87 (2023 17:32) (135/87 - 135/87)  BP(mean): --  RR: 18 (2023 17:32) (18 - 18)  SpO2: 96% (2023 17:32) (96% - 96%)    Parameters below as of 2023 17:32  Patient On (Oxygen Delivery Method): room air      Please see the above notes for Labs and radiology.     Assessment and Plan:     41 yo M with hx of Gastritis, Food impaction (resolved with glucagon injection) presents to ED for evaluation of globus sensation after eating turkey at 3 pm today.     Food impaction   - s/p glucagon x 3 in ED.   - Currently patient is protecting airway and able to speak in full sentences.   - NPO with IVF gentle hydration   - PIP BID   - Urgent EGD today with GI  - f/u GI     Leukocytosis likely reactive  - CT chest: neg for acute pathology. No PTX or pneumomediastinum.   - Denied any urinary symptoms.  - No fever.   - monitor for fever curve for now.     Recent CAP   - CT chest resolution of PNA.   - patient is on Augmentin course. continue with Augmentin to finish the course.     DVT ppx: Lovenox SC  GI ppx:  PPI   Diet: NPO   Activity: as tolerated.     Date seen by the attendin2023  Total time spent: 75 minutes.

## 2023-11-23 NOTE — H&P ADULT - NSHPPHYSICALEXAM_GEN_ALL_CORE
T(C): 36.7 (11-23-23 @ 17:32), Max: 36.7 (11-23-23 @ 17:32)  HR: 124 (11-23-23 @ 17:32) (124 - 124)  BP: 135/87 (11-23-23 @ 17:32) (135/87 - 135/87)  RR: 18 (11-23-23 @ 17:32) (18 - 18)  SpO2: 96% (11-23-23 @ 17:32) (96% - 96%)    CONSTITUTIONAL: NAD, no drooling, speaking in full sentences  HEENT: AT, NC  RESP: No respiratory distress, no use of accessory muscles, mild diffuse wheezing  CV: RRR, +S1S2, no peripheral edema  GI: Soft, NT, ND, no rebound, no guarding  MSK: Normal FROM without pain, normal muscle strength/tone  NEURO: Sensation intact in upper and lower extremities b/l to light touch   PSYCH: AAOx3
I have personally seen and examined this patient.  I have fully participated in the care of this patient. I have reviewed all pertinent clinical information, including history, physical exam, plan and the Resident’s note and agree except as noted.

## 2023-11-23 NOTE — H&P ADULT - ASSESSMENT
Pt is a 41 y/o M PMHx of gastritis and previous food impaction that resolved s/p glucagon injection who presented to the ED for food impaction after eating turkey at 3pm. Symptoms not resolved after 3 glucagon injections. Admitted for urgent EGD    #Food impaction  #Globus sensation  - Similar episode in past around 1.5 years ago: chicken food bolus s/p resolved with glucagon injections  - Recent EGD a few months ago at [place]: unremarkable per patient but no records available  - No drooling; protecting airway; able to complete full sentences   - Per GI fellow given leukocytosis r/o pneumomediastinum STAT CT Neck and Chest non contrast ordered  - NPO   - GI following possible urgent EGD once pneumomediastinum ruled out   Pt is a 43 y/o M PMHx of gastritis and previous food impaction that resolved s/p glucagon injection who presented to the ED for food impaction after eating turkey at 3pm. Symptoms not resolved after 3 glucagon injections. Admitted for urgent EGD    #Food impaction  #Globus sensation  - Similar episode in past around 1.5 years ago: chicken food bolus s/p resolved with glucagon injections  - Recent EGD a few months ago at [place]: unremarkable per patient but no records available  - No drooling; protecting airway; able to complete full sentences   - Per GI fellow given leukocytosis r/o pneumomediastinum STAT CT Neck and Chest non contrast ordered  - NPO   - PPI BID  - GI following possible urgent EGD once pneumomediastinum ruled out    #Pneumonia - resolving  - Pt reports he has 1 more day of Augmentin Abx       # Misc  - DVT Prophylaxis: Lovenox  - GI Prophylaxis: pantoprazole  - Diet: Diet, NPO  - Dispo:- likely DC in 24hrs

## 2023-11-23 NOTE — ED PROVIDER NOTE - CLINICAL SUMMARY MEDICAL DECISION MAKING FREE TEXT BOX
41 yo male, no PMHx, presenting with a sensation of food stuck in his lower esophagus after eating turkey too fast. He had a similar episode 1 year prior which required IV glucagon. After that episode, he had GI follow up with scopes but no abnormalities found. Patient attempted drinking carbonated drink at home but did not feel relief. On exam, patient tolerating secretion, speaking in full sentences, lungs clear to ausculation, airway clear without edema or exudates or pooling of secretions. Attempted giving carbonated drink and jumping up and down. 43 yo male, no PMHx, presenting with a sensation of food stuck in his lower esophagus after eating turkey too fast. He had a similar episode 1 year prior which required IV glucagon. After that episode, he had GI follow up with scopes but no abnormalities found. Patient attempted drinking carbonated drink at home but did not feel relief. On exam, patient tolerating secretion, speaking in full sentences, lungs clear to ausculation, airway clear without edema or exudates or pooling of secretions. Attempted giving carbonated drink but patient stated it did not work for him and would prefer glucagon. IV placed and glucagon 2mg IV given with relief. Tolerated PO. Given strict return precautions and follow up outpatient. Patient comfortable with plan. 41 yo male, no PMHx, presenting with a sensation of food stuck in his lower esophagus after eating turkey too fast. He had a similar episode 1 year prior which required IV glucagon. After that episode, he had GI follow up with scopes but no abnormalities found. Patient attempted drinking carbonated drink at home but did not feel relief. On exam, patient tolerating secretion, speaking in full sentences, lungs clear to ausculation, airway clear without edema or exudates or pooling of secretions. Attempted giving carbonated drink but patient stated it did not work for him and would prefer glucagon. IV placed and glucagon 2mg IV given x2. Sublingual nitro given.

## 2023-11-23 NOTE — CHART NOTE - NSCHARTNOTEFT_GEN_A_CORE
PACU ANESTHESIA ADMISSION NOTE      Procedure: EGD  Post op diagnosis:  food impaction -> eosinophilic esophagitis      __x__  Patent Airway    ____  Full return of protective reflexes    ____  Full recovery from anesthesia / back to baseline status    Vitals:  T(C): 36.7 (11-23-23 @ 22:54), Max: 36.9 (11-23-23 @ 22:50)  HR: 124 (11-23-23 @ 22:54) (116 - 124)  BP: 135/87 (11-23-23 @ 22:54) (135/87 - 135/87)  RR: 18 (11-23-23 @ 22:54) (16 - 18)  SpO2: 96% (11-23-23 @ 22:54) (96% - 97%)    Mental Status:  ____ Awake   _____ Alert   __x___ Drowsy   _____ Sedated    Nausea/Vomiting:  __x__ NO  ______Yes,   See Post - Op Orders          Pain Scale (0-10):  _____    Treatment: ____ None    __x__ See Post - Op/PCA Orders    Post - Operative Fluids:   ____ Oral   __x__ See Post - Op Orders    Plan: Discharge:   ____Home       ___x__Floor     _____Critical Care    _____  Other:_________________    Comments: Patient had smooth intraoperative event, no anesthesia complication.  PACU Vital signs: 97.9, 95%, 99/69, 14, 94

## 2023-11-23 NOTE — ED PROVIDER NOTE - PROGRESS NOTE DETAILS
CP: rpt /97 BF: S/o received from Dr. Riggins for food bolus impaction s/p glucagon x2, NTG SL x1. Will attempt NTG SL one more time and if no relief, will call GI for endoscopy. BF: GI consulted. Recommended stat INR, NPO. Will see pt for possible EGD. I ordered 4 mg morphine for pain control. Pt not drooling, tolerating secretions, appears to be in pain. BF: GI consenting pt for EGD at bedside BF: GI recommending an additional 2 mg glucagon IV which was ordered BF: Admitting pt to medicine

## 2023-11-23 NOTE — CONSULT NOTE ADULT - ASSESSMENT
Assessment and Plan  Case of a 42 year old male patient with history of gastritis and previous food impaction secondary to a chicken bolus that resolved s/p glucagon injection who presented to the ED on 11/23 for evaluation of globus sensation after eating [place] at 15:00 PM, found to be tachycardic with unremarkable blood work, to be admitted for further management. He is status post 2 IV injections of glucagon 2mg at 18:00 PM and 18:30 PM and sublingual administration at 19:00 PM with no improvement. We are consulted for concern of food impaction secondary to [place] food bolus.      Suspected Food Impaction Secondary to [place] Food Bolus  Globus Sensation  * Similar episode in past around 1.5 years ago: chicken food bolus s/p resolved with glucagon injections  * Recent EGD a few months ago at [place]: unremarkable per patient but no records available  * No prior history of Asthma or allergies or atopic dermatitis  * Not on blood thinners at home  * History: ate [place] (no bone or vegetable or potato) at 15:00 PM -> had sharp/pressure like epigastric discomfort that is 8/10 in intensity initially; associated with some nausea, regurgitation of food (spitting out chunks of [place]), and palpitations; vomited water after attempting to drink water; denies drooling; protecting airway; able to complete full sentences   * Exam: Airway protected, no active drooling, speaking in full sentences   * ED Vitals: /87 mmHg,  bpm (sinus tachyucardia); RR 20 bpm, T 36.9 degrees  * ED Labs: WBC 7.47, Hb 15.3, Plt 434  * INR pending    RECOMMENDATIONS  - Please keep patient NPO  - Recommend administering a third IV injection of 2mg glucagon STAT. Status post 2 IV injections of glucagon 2mg at 18:00 PM and 18:30 PM and sublingual administration at 19:00 PM with no improvement  - Will schedule patient for an urgent EGD for food disimpaction if no improvement after the 3rd injection of glucagon  - Check coagulation profile. Please correct INR to <1.5 (will need 2 FFP if INR is elevated)      Thank you for your consult.  - Please note that plan was discussed with Dr. Crawford and communicated with medical team.   - Please reach GI on [phone] during weekdays till 5pm.  - Please call the GI service line after 5pm on Weekdays and anytime on Weekends: [phone].      Marie Ahn MD  PGY - 4 Gastroenterology Fellow   [facility]     Assessment and Plan  Case of a 42 year old male patient with history of gastritis and previous food impaction secondary to a chicken bolus that resolved s/p glucagon injection who presented to the ED on 11/23 for evaluation of globus sensation after eating [place] at 15:00 PM, found to be tachycardic with unremarkable blood work, to be admitted for further management. He is status post 2 IV injections of glucagon 2mg at 18:00 PM and 18:30 PM and sublingual administration at 19:00 PM with no improvement. We are consulted for concern of food impaction secondary to [place] food bolus.      Suspected Food Impaction Secondary to [place] Food Bolus  Globus Sensation  History of Food Impaction Status Post Resolution with Glucagon Administration  No History of Atopic Diseases  * Similar episode in past around 1.5 years ago: chicken food bolus s/p resolved with glucagon injections  * Recent EGD a few months ago at [place]: unremarkable per patient but no records available  * No prior history of Asthma or allergies or atopic dermatitis  * Not on blood thinners at home  * History: ate [place] (no bone or vegetable or potato) at 15:00 PM -> had sharp/pressure like epigastric discomfort that is 8/10 in intensity initially; associated with some nausea, regurgitation of food (spitting out chunks of [place]), and palpitations; vomited water after attempting to drink water; denies drooling; protecting airway; able to complete full sentences   * Exam: Airway protected, no active drooling, speaking in full sentences   * ED Vitals: /87 mmHg,  bpm (sinus tachyucardia); RR 20 bpm, T 36.9 degrees  * ED Labs: WBC 7.47, Hb 15.3, Plt 434  * INR pending    RECOMMENDATIONS  - Please keep patient NPO  - Recommend administering a third IV injection of 2mg glucagon STAT. Status post 2 IV injections of glucagon 2mg at 18:00 PM and 18:30 PM and sublingual administration at 19:00 PM with no improvement  - Will schedule patient for an urgent EGD for food disimpaction if no improvement after the 3rd injection of glucagon  - Check coagulation profile. Please correct INR to <1.5 (will need 2 FFP if INR is elevated)  - Start IV protonix 40mg BID for now      Colorectal Cancer Screening  * Family history of colon cancer in grandmother (Age?)  * Had colonoscopy in 05/2023: in [place] unremarkable per patient; no polyps or diverticulosis; asked to repeat in 10 years    RECOMMENDATIONS  - Will need repeat colonoscopy in 10 years (unless otherwise indicated)  - Would obtain outpatient records for completion of documentation in chart      Thank you for your consult.  - Please note that plan was discussed with Dr. Crawford and communicated with medical team.   - Please reach GI on [phone] during weekdays till 5pm.  - Please call the GI service line after 5pm on Weekdays and anytime on Weekends: [phone].      Marie Ahn MD  PGY - 4 Gastroenterology Fellow   [facility]

## 2023-11-23 NOTE — ED PROVIDER NOTE - PHYSICAL EXAMINATION
CONSTITUTIONAL: Well-developed; well-nourished;  SKIN: warm, dry  HEAD: Normocephalic; atraumatic.  EYES: PERRL, EOMI, no conjunctival erythema  ENT: No nasal discharge; airway clear.  NECK: Supple; non tender.  CARD: S1, S2 normal; no murmurs, gallops, or rubs. Regular rate and rhythm.   RESP: No wheezes, rales or rhonchi.  ABD: abdomen soft nontedner   EXT: Normal ROM.  No clubbing, cyanosis or edema.   LYMPH: No acute cervical adenopathy.  NEURO: Alert, oriented, grossly unremarkable  PSYCH: Cooperative, appropriate.

## 2023-11-23 NOTE — ED PROVIDER NOTE - NSFOLLOWUPINSTRUCTIONS_ED_ALL_ED_FT
Swallowed Foreign Body, Adult  Body outline showing the digestive tract, including the stomach and esophagus.  A swallowed foreign body is an object that gets stuck in the digestive tract, either in the part of the body that moves food from the mouth to the stomach (esophagus) or in another part. When a person swallows an object, it passes into the esophagus. The narrowest place in the digestive system is where the esophagus meets the stomach. If the object can pass through that place, it will usually continue through the rest of the digestive system without causing problems. A foreign body that gets stuck may need to be removed.    Foreign bodies may be swallowed by accident or on purpose. It is very important to tell your health care provider what you have swallowed. Some swallowed objects can be life-threatening, and you may need emergency treatment. Dangerous swallowed foreign bodies include:  Objects that get stuck in your throat.  Objects that make you unable to swallow.  Objects that interfere with your breathing.  Sharp objects.  Harmful or poisonous (toxic) objects, such as batteries or illegal drugs.  What are the causes?  The most common swallowed foreign body is food that will not pass through your esophagus to your stomach (food impaction). Foods that commonly become impacted include meats and hard vegetables such as carrots and radishes.    Other common swallowed foreign bodies include:  Pieces of bone from meat or fish.  Toothpicks.  Dentures.  What increases the risk?  You are more likely to have a swallowed foreign body if you:  Wear dentures.  Have been drinking alcohol or taking drugs.  Have a mental health condition.  Have difficulties with thinking and learning (cognitive impairment).  Have a narrowed or scarred area in your digestive tract.  What are the signs or symptoms?  Symptoms of this condition include:  Pain or pressure in your throat or chest.  Not being able to swallow food, liquid, or your saliva.  Drooling.  Choking.  A hoarse voice.  Noisy breathing or trouble breathing.  Vomit that has blood in it.  How is this diagnosed?  This condition may be diagnosed based on your symptoms and medical history. Your health care provider will do a physical exam to confirm the diagnosis and to find the object. A metal detector may be used to find metal objects. Imaging studies may also be done, including:  X-rays.  A CT scan.  Some objects may not be seen on imaging studies. In those cases, an exam or procedure may be done using a scope to look into your esophagus (endoscopy).    How is this treated?  Usually, an object that has passed into your stomach but is not dangerous will pass through your digestive system without treatment. If the swallowed object is not dangerous but is stuck in your esophagus:  Your health care provider may gently suction out the object through your mouth.  You may be given medicine to relax the muscles of your esophagus to allow the object to pass through.  An endoscopy may be done to find and remove the object if it does not pass through with medicine. Your health care provider will put medical instruments through the endoscope to remove the object. You may need emergency treatment if:  The object is in your esophagus and is causing you to inhale saliva into your lungs (aspirate).  The object is in your esophagus and is pressing on your airway. This makes it hard for you to breathe.  The object can damage your digestive tract. Some objects that can cause damage include batteries, magnets, sharp objects, and drugs.  Follow these instructions at home:  Caring for yourself    If the object in your digestive system is expected to pass:  Continue eating what you usually eat unless your health care provider gives you different instructions.  Check your stool after every bowel movement to see if you have passed the object.  If you had an endoscopic procedure to remove the foreign body, follow instructions from your health care provider about caring for yourself after the procedure.  General instructions    Take over-the-counter and prescription medicines only as told by your health care provider.  Keep all follow-up visits, including any for repeat imaging tests. This is important.  How is this prevented?  Cut your food into small pieces.  Always sit upright while eating.  Remove bones from meat and fish.  Chew your food well before swallowing.  Do not talk, laugh, or walk around while eating or swallowing.  Contact a health care provider if:  You continue to have symptoms of a swallowed foreign body.  The object has not passed out of your body after 3 days.  Get help right away if:  You have a fever.  You have pain in your chest or your abdomen.  You cough up blood.  You have blood in your stool (feces).  You have blood in your vomit after treatment.  These symptoms may be an emergency. Get help right away. Call 911.  Do not wait to see if the symptoms will go away.  Do not drive yourself to the hospital.  Summary  A swallowed foreign body is an object that gets stuck in the digestive tract, either in the esophagus or in another part.  Usually, an object that has passed into your stomach but is not dangerous will pass through your digestive system without treatment.  Endoscopy may be done to find and remove the object.  If the object in your digestive system is expected to pass, contact your health care provider if the object has not passed after 3 days.  Get help right away if you have blood in your stool or there is blood when you cough or vomit.  This information is not intended to replace advice given to you by your health care provider. Make sure you discuss any questions you have with your health care provider.

## 2023-11-23 NOTE — CONSULT NOTE ADULT - SUBJECTIVE AND OBJECTIVE BOX
Gastroenterology Initial Consult Note      Location: [facility] ([facility])  Patient Name: LANA CUNNINGHAM  Age: 42y  Gender: Male      Chief Complaint  Patient is a 42y old Male who presents with a chief complaint of Globus sensation  Primary diagnosis of Food Impaction      Reason for Consult  Food Impaction      History of Present Illness  42 year old male patient with history of gastritis and previous food impaction secondary to a chicken bolus that resolved s/p glucagon injection who presented to the ED on 11/23 for evaluation of globus sensation after eating [place] at 15:00 PM, found to be tachycardic with unremarkable blood work, to be admitted for further management. He is status post 2 IV injections of glucagon 2mg at 18:00 PM and 18:30 PM and sublingual administration at 19:00 PM with no improvement. We are consulted for concern of food impaction secondary to [place] food bolus.    Summary:  * Similar episode in past around 1.5 years ago: chicken food bolus s/p resolved with glucagon injections  * Recent EGD a few months ago at [place]: unremarkable per patient but no records available  * No prior history of Asthma or allergies or atopic dermatitis  * Not on blood thinners at home  * History: ate [place] (no bone or vegetable or potato) at 15:00 PM -> had sharp/pressure like epigastric discomfort that is 8/10 in intensity initially; associated with some nausea, regurgitation of food (spitting out chunks of [place]), and palpitations; vomited water after attempting to drink water; denies drooling; protecting airway; able to complete full sentences   * Exam: Airway protected, no active drooling, speaking in full sentences   * ED Vitals: /87 mmHg,  bpm (sinus tachyucardia); RR 20 bpm, T 36.9 degrees  * ED Labs: WBC 7.47, Hb 15.3, Plt 434  * INR pending    - Recommend administering a third IV injection of 2mg glucagon STAT. Status post 2 IV injections of glucagon 2mg at 18:00 PM and 18:30 PM and sublingual administration at 19:00 PM with no improvement  - Will schedule patient for an urgent EGD for food disimpaction if no improvement after the 3rd injection of glucagon      Prior EGD:  as below      Prior Colonoscopy:  as below      Past Medical and Surgical History:  No pertinent past medical history  No significant past surgical history      Home Medications:  Home Medications:  as per med rec      Social History:  Tobacco: denies  Alcohol: denies  Drugs: denies    Allergies:  No Known Allergies      Family History:  FAMILY HISTORY:  Colon cancer in grand mother      Vital Signs in the last 24 hours   Vitals Summary T(C): 36.7 (11-23-23 @ 17:32), Max: 36.7 (11-23-23 @ 17:32)  HR: 124 (11-23-23 @ 17:32) (124 - 124)  BP: 135/87 (11-23-23 @ 17:32) (135/87 - 135/87)  RR: 18 (11-23-23 @ 17:32) (18 - 18)  SpO2: 96% (11-23-23 @ 17:32) (96% - 96%)  Vent Data   Intake/ Output   Measurements Height (cm): 177.8 (11-23-23 @ 17:32)  Weight (kg): 83.9 (11-23-23 @ 17:32)  BMI (kg/m2): 26.5 (11-23-23 @ 17:32)  BSA (m2): 2.02 (11-23-23 @ 17:32)      Physical Exam  * General Appearance: Alert, cooperative, interactive, oriented to time, place, and person, in some distress from epigastric discomfort and globus sensation  * Eyes: PERRL, conjunctiva/corneas clear, EOM's intact, fundi benign, both eyes  * Lungs: Good bilateral air entry, normal breath sounds   * Heart: Regular Rate and Rhythm, normal S1 and S2, no audible murmur, rub, or gallop  * Abdomen: Symmetric, non-distended, no scar, soft, non-tender, bowel sounds active all four quadrants, no masses, no organomegaly (no hepatosplenomegaly)      Investigations   Laboratory Workup      - CBC:                        15.8   18.73 )-----------( 404      ( 23 Nov 2023 19:30 )             44.9       - Hgb Trend:  15.8  11-23-23 @ 19:30      Liver panel trend:  TBili 0.3   /   AST 14   /   ALT 12   /   AlkP 75   /   Tptn 7.2   /   Alb 4.6    /   DBili --      11-18      Current Medications  Standing Medications  glucagon  Injectable 2 milliGRAM(s) IV Push Once    PRN Medications    Singles Doses Administered  (ADM OVERRIDE) 1 each &lt;see task&gt; GiveOnce  (ADM OVERRIDE) 1 each &lt;see task&gt; GiveOnce  (ADM OVERRIDE) 1 each &lt;see task&gt; GiveOnce  (ADM OVERRIDE) 1 each &lt;see task&gt; GiveOnce  glucagon  Injectable 2 milliGRAM(s) IV Push Once  glucagon  Injectable 2 milliGRAM(s) IV Push Once  morphine  - Injectable 4 milliGRAM(s) IV Push Once  nitroglycerin     SubLingual 0.4 milliGRAM(s) SubLingual Once

## 2023-11-23 NOTE — CHART NOTE - NSCHARTNOTEFT_GEN_A_CORE
- On bedside evaluation at 20:15 PM, the glucagon has not been administered yet  - The covering nurse was asked to administer glucagon as soon as possible at 20:15 PM

## 2023-11-23 NOTE — CHART NOTE - NSCHARTNOTESELECT_GEN_ALL_CORE
EGD 11/23/Event Note
GI Follow Up/Event Note
pacu transfer/Transfer Note

## 2023-11-24 VITALS — HEART RATE: 118 BPM | TEMPERATURE: 98 F | SYSTOLIC BLOOD PRESSURE: 127 MMHG | DIASTOLIC BLOOD PRESSURE: 83 MMHG

## 2023-11-24 PROBLEM — Z78.9 OTHER SPECIFIED HEALTH STATUS: Chronic | Status: ACTIVE | Noted: 2023-11-18

## 2023-11-24 LAB
ALBUMIN SERPL ELPH-MCNC: 5 G/DL — SIGNIFICANT CHANGE UP (ref 3.5–5.2)
ALBUMIN SERPL ELPH-MCNC: 5 G/DL — SIGNIFICANT CHANGE UP (ref 3.5–5.2)
ALP SERPL-CCNC: 62 U/L — SIGNIFICANT CHANGE UP (ref 30–115)
ALP SERPL-CCNC: 62 U/L — SIGNIFICANT CHANGE UP (ref 30–115)
ALT FLD-CCNC: 19 U/L — SIGNIFICANT CHANGE UP (ref 0–41)
ALT FLD-CCNC: 19 U/L — SIGNIFICANT CHANGE UP (ref 0–41)
ANION GAP SERPL CALC-SCNC: 14 MMOL/L — SIGNIFICANT CHANGE UP (ref 7–14)
ANION GAP SERPL CALC-SCNC: 14 MMOL/L — SIGNIFICANT CHANGE UP (ref 7–14)
AST SERPL-CCNC: 22 U/L — SIGNIFICANT CHANGE UP (ref 0–41)
AST SERPL-CCNC: 22 U/L — SIGNIFICANT CHANGE UP (ref 0–41)
BASOPHILS # BLD AUTO: 0.01 K/UL — SIGNIFICANT CHANGE UP (ref 0–0.2)
BASOPHILS # BLD AUTO: 0.01 K/UL — SIGNIFICANT CHANGE UP (ref 0–0.2)
BASOPHILS # BLD AUTO: 0.04 K/UL — SIGNIFICANT CHANGE UP (ref 0–0.2)
BASOPHILS # BLD AUTO: 0.04 K/UL — SIGNIFICANT CHANGE UP (ref 0–0.2)
BASOPHILS NFR BLD AUTO: 0.1 % — SIGNIFICANT CHANGE UP (ref 0–1)
BASOPHILS NFR BLD AUTO: 0.1 % — SIGNIFICANT CHANGE UP (ref 0–1)
BASOPHILS NFR BLD AUTO: 0.3 % — SIGNIFICANT CHANGE UP (ref 0–1)
BASOPHILS NFR BLD AUTO: 0.3 % — SIGNIFICANT CHANGE UP (ref 0–1)
BILIRUB SERPL-MCNC: 1 MG/DL — SIGNIFICANT CHANGE UP (ref 0.2–1.2)
BILIRUB SERPL-MCNC: 1 MG/DL — SIGNIFICANT CHANGE UP (ref 0.2–1.2)
BUN SERPL-MCNC: 15 MG/DL — SIGNIFICANT CHANGE UP (ref 10–20)
BUN SERPL-MCNC: 15 MG/DL — SIGNIFICANT CHANGE UP (ref 10–20)
CALCIUM SERPL-MCNC: 10 MG/DL — SIGNIFICANT CHANGE UP (ref 8.4–10.4)
CALCIUM SERPL-MCNC: 10 MG/DL — SIGNIFICANT CHANGE UP (ref 8.4–10.4)
CHLORIDE SERPL-SCNC: 101 MMOL/L — SIGNIFICANT CHANGE UP (ref 98–110)
CHLORIDE SERPL-SCNC: 101 MMOL/L — SIGNIFICANT CHANGE UP (ref 98–110)
CO2 SERPL-SCNC: 23 MMOL/L — SIGNIFICANT CHANGE UP (ref 17–32)
CO2 SERPL-SCNC: 23 MMOL/L — SIGNIFICANT CHANGE UP (ref 17–32)
CREAT SERPL-MCNC: 0.9 MG/DL — SIGNIFICANT CHANGE UP (ref 0.7–1.5)
CREAT SERPL-MCNC: 0.9 MG/DL — SIGNIFICANT CHANGE UP (ref 0.7–1.5)
EGFR: 111 ML/MIN/1.73M2 — SIGNIFICANT CHANGE UP
EGFR: 111 ML/MIN/1.73M2 — SIGNIFICANT CHANGE UP
EOSINOPHIL # BLD AUTO: 0.01 K/UL — SIGNIFICANT CHANGE UP (ref 0–0.7)
EOSINOPHIL # BLD AUTO: 0.01 K/UL — SIGNIFICANT CHANGE UP (ref 0–0.7)
EOSINOPHIL # BLD AUTO: 0.05 K/UL — SIGNIFICANT CHANGE UP (ref 0–0.7)
EOSINOPHIL # BLD AUTO: 0.05 K/UL — SIGNIFICANT CHANGE UP (ref 0–0.7)
EOSINOPHIL NFR BLD AUTO: 0.1 % — SIGNIFICANT CHANGE UP (ref 0–8)
EOSINOPHIL NFR BLD AUTO: 0.1 % — SIGNIFICANT CHANGE UP (ref 0–8)
EOSINOPHIL NFR BLD AUTO: 0.4 % — SIGNIFICANT CHANGE UP (ref 0–8)
EOSINOPHIL NFR BLD AUTO: 0.4 % — SIGNIFICANT CHANGE UP (ref 0–8)
GLUCOSE BLDC GLUCOMTR-MCNC: 104 MG/DL — HIGH (ref 70–99)
GLUCOSE BLDC GLUCOMTR-MCNC: 104 MG/DL — HIGH (ref 70–99)
GLUCOSE BLDC GLUCOMTR-MCNC: 108 MG/DL — HIGH (ref 70–99)
GLUCOSE BLDC GLUCOMTR-MCNC: 108 MG/DL — HIGH (ref 70–99)
GLUCOSE SERPL-MCNC: 99 MG/DL — SIGNIFICANT CHANGE UP (ref 70–99)
GLUCOSE SERPL-MCNC: 99 MG/DL — SIGNIFICANT CHANGE UP (ref 70–99)
HCT VFR BLD CALC: 43.7 % — SIGNIFICANT CHANGE UP (ref 42–52)
HCT VFR BLD CALC: 43.7 % — SIGNIFICANT CHANGE UP (ref 42–52)
HCT VFR BLD CALC: 44.3 % — SIGNIFICANT CHANGE UP (ref 42–52)
HCT VFR BLD CALC: 44.3 % — SIGNIFICANT CHANGE UP (ref 42–52)
HGB BLD-MCNC: 15.5 G/DL — SIGNIFICANT CHANGE UP (ref 14–18)
HGB BLD-MCNC: 15.5 G/DL — SIGNIFICANT CHANGE UP (ref 14–18)
HGB BLD-MCNC: 16 G/DL — SIGNIFICANT CHANGE UP (ref 14–18)
HGB BLD-MCNC: 16 G/DL — SIGNIFICANT CHANGE UP (ref 14–18)
IMM GRANULOCYTES NFR BLD AUTO: 0.5 % — HIGH (ref 0.1–0.3)
IMM GRANULOCYTES NFR BLD AUTO: 0.5 % — HIGH (ref 0.1–0.3)
IMM GRANULOCYTES NFR BLD AUTO: 0.6 % — HIGH (ref 0.1–0.3)
IMM GRANULOCYTES NFR BLD AUTO: 0.6 % — HIGH (ref 0.1–0.3)
LYMPHOCYTES # BLD AUTO: 1.12 K/UL — LOW (ref 1.2–3.4)
LYMPHOCYTES # BLD AUTO: 1.12 K/UL — LOW (ref 1.2–3.4)
LYMPHOCYTES # BLD AUTO: 1.13 K/UL — LOW (ref 1.2–3.4)
LYMPHOCYTES # BLD AUTO: 1.13 K/UL — LOW (ref 1.2–3.4)
LYMPHOCYTES # BLD AUTO: 8.7 % — LOW (ref 20.5–51.1)
LYMPHOCYTES # BLD AUTO: 8.7 % — LOW (ref 20.5–51.1)
LYMPHOCYTES # BLD AUTO: 9.4 % — LOW (ref 20.5–51.1)
LYMPHOCYTES # BLD AUTO: 9.4 % — LOW (ref 20.5–51.1)
MAGNESIUM SERPL-MCNC: 2.2 MG/DL — SIGNIFICANT CHANGE UP (ref 1.8–2.4)
MAGNESIUM SERPL-MCNC: 2.2 MG/DL — SIGNIFICANT CHANGE UP (ref 1.8–2.4)
MCHC RBC-ENTMCNC: 31.2 PG — HIGH (ref 27–31)
MCHC RBC-ENTMCNC: 31.2 PG — HIGH (ref 27–31)
MCHC RBC-ENTMCNC: 31.9 PG — HIGH (ref 27–31)
MCHC RBC-ENTMCNC: 31.9 PG — HIGH (ref 27–31)
MCHC RBC-ENTMCNC: 35 G/DL — SIGNIFICANT CHANGE UP (ref 32–37)
MCHC RBC-ENTMCNC: 35 G/DL — SIGNIFICANT CHANGE UP (ref 32–37)
MCHC RBC-ENTMCNC: 36.6 G/DL — SIGNIFICANT CHANGE UP (ref 32–37)
MCHC RBC-ENTMCNC: 36.6 G/DL — SIGNIFICANT CHANGE UP (ref 32–37)
MCV RBC AUTO: 87.2 FL — SIGNIFICANT CHANGE UP (ref 80–94)
MCV RBC AUTO: 87.2 FL — SIGNIFICANT CHANGE UP (ref 80–94)
MCV RBC AUTO: 89.1 FL — SIGNIFICANT CHANGE UP (ref 80–94)
MCV RBC AUTO: 89.1 FL — SIGNIFICANT CHANGE UP (ref 80–94)
MONOCYTES # BLD AUTO: 0.2 K/UL — SIGNIFICANT CHANGE UP (ref 0.1–0.6)
MONOCYTES # BLD AUTO: 0.2 K/UL — SIGNIFICANT CHANGE UP (ref 0.1–0.6)
MONOCYTES # BLD AUTO: 0.26 K/UL — SIGNIFICANT CHANGE UP (ref 0.1–0.6)
MONOCYTES # BLD AUTO: 0.26 K/UL — SIGNIFICANT CHANGE UP (ref 0.1–0.6)
MONOCYTES NFR BLD AUTO: 1.6 % — LOW (ref 1.7–9.3)
MONOCYTES NFR BLD AUTO: 1.6 % — LOW (ref 1.7–9.3)
MONOCYTES NFR BLD AUTO: 2.2 % — SIGNIFICANT CHANGE UP (ref 1.7–9.3)
MONOCYTES NFR BLD AUTO: 2.2 % — SIGNIFICANT CHANGE UP (ref 1.7–9.3)
NEUTROPHILS # BLD AUTO: 10.57 K/UL — HIGH (ref 1.4–6.5)
NEUTROPHILS # BLD AUTO: 10.57 K/UL — HIGH (ref 1.4–6.5)
NEUTROPHILS # BLD AUTO: 11.34 K/UL — HIGH (ref 1.4–6.5)
NEUTROPHILS # BLD AUTO: 11.34 K/UL — HIGH (ref 1.4–6.5)
NEUTROPHILS NFR BLD AUTO: 87.6 % — HIGH (ref 42.2–75.2)
NEUTROPHILS NFR BLD AUTO: 87.6 % — HIGH (ref 42.2–75.2)
NEUTROPHILS NFR BLD AUTO: 88.5 % — HIGH (ref 42.2–75.2)
NEUTROPHILS NFR BLD AUTO: 88.5 % — HIGH (ref 42.2–75.2)
NRBC # BLD: 0 /100 WBCS — SIGNIFICANT CHANGE UP (ref 0–0)
PLATELET # BLD AUTO: 346 K/UL — SIGNIFICANT CHANGE UP (ref 130–400)
PLATELET # BLD AUTO: 346 K/UL — SIGNIFICANT CHANGE UP (ref 130–400)
PLATELET # BLD AUTO: 381 K/UL — SIGNIFICANT CHANGE UP (ref 130–400)
PLATELET # BLD AUTO: 381 K/UL — SIGNIFICANT CHANGE UP (ref 130–400)
PMV BLD: 9.4 FL — SIGNIFICANT CHANGE UP (ref 7.4–10.4)
PMV BLD: 9.4 FL — SIGNIFICANT CHANGE UP (ref 7.4–10.4)
PMV BLD: 9.8 FL — SIGNIFICANT CHANGE UP (ref 7.4–10.4)
PMV BLD: 9.8 FL — SIGNIFICANT CHANGE UP (ref 7.4–10.4)
POTASSIUM SERPL-MCNC: 4.4 MMOL/L — SIGNIFICANT CHANGE UP (ref 3.5–5)
POTASSIUM SERPL-MCNC: 4.4 MMOL/L — SIGNIFICANT CHANGE UP (ref 3.5–5)
POTASSIUM SERPL-SCNC: 4.4 MMOL/L — SIGNIFICANT CHANGE UP (ref 3.5–5)
POTASSIUM SERPL-SCNC: 4.4 MMOL/L — SIGNIFICANT CHANGE UP (ref 3.5–5)
PROT SERPL-MCNC: 7.6 G/DL — SIGNIFICANT CHANGE UP (ref 6–8)
PROT SERPL-MCNC: 7.6 G/DL — SIGNIFICANT CHANGE UP (ref 6–8)
RBC # BLD: 4.97 M/UL — SIGNIFICANT CHANGE UP (ref 4.7–6.1)
RBC # BLD: 4.97 M/UL — SIGNIFICANT CHANGE UP (ref 4.7–6.1)
RBC # BLD: 5.01 M/UL — SIGNIFICANT CHANGE UP (ref 4.7–6.1)
RBC # BLD: 5.01 M/UL — SIGNIFICANT CHANGE UP (ref 4.7–6.1)
RBC # FLD: 12.5 % — SIGNIFICANT CHANGE UP (ref 11.5–14.5)
RBC # FLD: 12.5 % — SIGNIFICANT CHANGE UP (ref 11.5–14.5)
RBC # FLD: 12.6 % — SIGNIFICANT CHANGE UP (ref 11.5–14.5)
RBC # FLD: 12.6 % — SIGNIFICANT CHANGE UP (ref 11.5–14.5)
SODIUM SERPL-SCNC: 138 MMOL/L — SIGNIFICANT CHANGE UP (ref 135–146)
SODIUM SERPL-SCNC: 138 MMOL/L — SIGNIFICANT CHANGE UP (ref 135–146)
WBC # BLD: 12.05 K/UL — HIGH (ref 4.8–10.8)
WBC # BLD: 12.05 K/UL — HIGH (ref 4.8–10.8)
WBC # BLD: 12.81 K/UL — HIGH (ref 4.8–10.8)
WBC # BLD: 12.81 K/UL — HIGH (ref 4.8–10.8)
WBC # FLD AUTO: 12.05 K/UL — HIGH (ref 4.8–10.8)
WBC # FLD AUTO: 12.05 K/UL — HIGH (ref 4.8–10.8)
WBC # FLD AUTO: 12.81 K/UL — HIGH (ref 4.8–10.8)
WBC # FLD AUTO: 12.81 K/UL — HIGH (ref 4.8–10.8)

## 2023-11-24 PROCEDURE — 99239 HOSP IP/OBS DSCHRG MGMT >30: CPT

## 2023-11-24 RX ORDER — SUCRALFATE 1 G
1 TABLET ORAL
Refills: 0 | Status: DISCONTINUED | OUTPATIENT
Start: 2023-11-24 | End: 2023-11-24

## 2023-11-24 RX ORDER — PANTOPRAZOLE SODIUM 20 MG/1
40 TABLET, DELAYED RELEASE ORAL
Refills: 0 | Status: DISCONTINUED | OUTPATIENT
Start: 2023-11-24 | End: 2023-11-24

## 2023-11-24 RX ORDER — INFLUENZA VIRUS VACCINE 15; 15; 15; 15 UG/.5ML; UG/.5ML; UG/.5ML; UG/.5ML
0.5 SUSPENSION INTRAMUSCULAR ONCE
Refills: 0 | Status: DISCONTINUED | OUTPATIENT
Start: 2023-11-24 | End: 2023-11-24

## 2023-11-24 RX ORDER — PANTOPRAZOLE SODIUM 20 MG/1
1 TABLET, DELAYED RELEASE ORAL
Qty: 60 | Refills: 0
Start: 2023-11-24 | End: 2023-12-23

## 2023-11-24 RX ORDER — IPRATROPIUM/ALBUTEROL SULFATE 18-103MCG
3 AEROSOL WITH ADAPTER (GRAM) INHALATION
Qty: 0 | Refills: 0 | DISCHARGE
Start: 2023-11-24

## 2023-11-24 RX ORDER — SUCRALFATE 1 G
1 TABLET ORAL
Qty: 120 | Refills: 0
Start: 2023-11-24 | End: 2023-12-23

## 2023-11-24 RX ADMIN — SODIUM CHLORIDE 75 MILLILITER(S): 9 INJECTION, SOLUTION INTRAVENOUS at 02:46

## 2023-11-24 RX ADMIN — Medication 3 MILLILITER(S): at 13:14

## 2023-11-24 RX ADMIN — ENOXAPARIN SODIUM 40 MILLIGRAM(S): 100 INJECTION SUBCUTANEOUS at 05:42

## 2023-11-24 RX ADMIN — Medication 3 MILLILITER(S): at 08:20

## 2023-11-24 RX ADMIN — Medication 1 TABLET(S): at 05:42

## 2023-11-24 RX ADMIN — Medication 1 GRAM(S): at 11:40

## 2023-11-24 RX ADMIN — PANTOPRAZOLE SODIUM 40 MILLIGRAM(S): 20 TABLET, DELAYED RELEASE ORAL at 05:41

## 2023-11-24 NOTE — DISCHARGE NOTE PROVIDER - PROVIDER TOKENS
PROVIDER:[TOKEN:[[id number]],FOLLOWUP:[1 week]],PROVIDER:[TOKEN:[[id number]],FOLLOWUP:[1 week]]

## 2023-11-24 NOTE — PATIENT PROFILE ADULT - FALL HARM RISK - UNIVERSAL INTERVENTIONS
Bed in lowest position, wheels locked, appropriate side rails in place/Call bell, personal items and telephone in reach/Instruct patient to call for assistance before getting out of bed or chair/Non-slip footwear when patient is out of bed/[place] to call system/Physically safe environment - no spills, clutter or unnecessary equipment/Purposeful Proactive Rounding/Room/bathroom lighting operational, light cord in reach

## 2023-11-24 NOTE — DISCHARGE NOTE NURSING/CASE MANAGEMENT/SOCIAL WORK - PATIENT PORTAL LINK FT
You can access the FollowMyHealth Patient Portal offered by [facility] by registering at the following website: [email or web address]. By joining [email or web address] FollowMyHealth portal, you will also be able to view your health information using other applications (apps) compatible with our system.

## 2023-11-24 NOTE — DISCHARGE NOTE PROVIDER - CARE PROVIDERS DIRECT ADDRESSES
,DirectAddress_Unknown,[email or web address]

## 2023-11-24 NOTE — DISCHARGE NOTE PROVIDER - NSDCMRMEDTOKEN_GEN_ALL_CORE_FT
amoxicillin-clavulanate 875 mg-125 mg oral tablet: 875 tab(s) orally 2 times a day   amoxicillin-clavulanate 875 mg-125 mg oral tablet: 875 tab(s) orally 2 times a day  pantoprazole 40 mg oral delayed release tablet: 1 tab(s) orally 2 times a day  sucralfate 1 g oral tablet: 1 tab(s) orally 4 times a day

## 2023-11-24 NOTE — DISCHARGE NOTE PROVIDER - HOSPITAL COURSE
Pt is a 41 y/o M PMHx of gastritis and previous food impaction that resolved s/p glucagon injection who presented to the ED for evaluation of globus sensation after eating [place] at 3pm, GI consulted urgently in ED. Pt is s/p 3 glucagon injections. He has been regurgitating portions of food since admission and c/o globus sensation. Denies any chest pain, abdominal pain. nausea, fevers, and chills.    In ED vitals: /87  (sinus tach) RR 18 T98F sat 96% on RA. Pt is protecting his airway. Labs: WBC increased from 7 on admission to 18.7. s/p glucagon x 3 in ED.  Currently patient is protecting airway and able to speak in full sentences. GI consulted and in the setting of leukocytosis, tachycardia, and epigastric discomfort, the team was asked to perform STAT CT. CT Neck Soft Tissue No Cont (11.23.23 @ 21:42) No acute abnormality, specifically no definite CT evidence of  pneumomediastinum or subcutaneous emphysema within the soft tissues of the neck. Please note that evaluation of the upper esophagus is limited on this exam due to motion artifact.  CT Chest No Cont (11.23.23 @ 21:43) There is food material seem at the GE junction. Food impaction possible. Left lower lobe with multifocal impaction patchy and nodular groundglass opacities likely postinfectious/inflammatory. Patient had EGD done showing Corrugated mucosa and linear furrows in the whole esophagus compatible with esophagitis. Biospy obtained, A food bolus at the distal aspect of the esophagus which was successfully pushed into the stomach. A small mucosal tear was noted at the distal aspect of the esophagus, likely corresponding to sequela of dilation of a suspected esophageal ring. Some oozing of blood was initially noted at the site of the tear. Erosions in the stomach compatible with erosive gastritis. (Biopsy). Normal mucosa in the whole examined duodenum. Patient currently tolerating soft and bite sized food. Started on oral PPI, carafate. Avoid use of NSAIDS. Finishing course of abx for PNA treatment started last week. Patient medically stable for discharge.        Food impaction   - Currently patient is protecting airway and able to speak in full sentences.   - NPO with IVF gentle hydration   - PIP BID   - s/p EGD with removal of food impaction, gastritis,   - f/u GI outpt  -c/w protonix and carafate    Leukocytosis likely reactive  - CT chest: neg for acute pathology. No PTX or pneumomediastinum.   - Denied any urinary symptoms.  - No fever.   - monitor for fever curve for now.     Recent CAP   - CT chest resolution of PNA.   - patient is on Augmentin course. continue with Augmentin to finish the course.   -Repeat CT chest in 3 months Medicine attending -    Patient seen and examined this morning  lying comfortably in bed  tolerated liquid diet  asking to go home  given soft diet for lunch and tolerated as well     Vital Signs Last 24 Hrs  T(C): 36.6 (24 Nov 2023 08:05), Max: 36.9 (23 Nov 2023 22:50)  T(F): 97.9 (24 Nov 2023 08:05), Max: 98.5 (23 Nov 2023 22:50)  HR: 118 (24 Nov 2023 08:05) (92 - 124)  BP: 127/83 (24 Nov 2023 08:05) (99/69 - 140/99)  BP(mean): --  RR: 18 (24 Nov 2023 00:54) (16 - 18)  SpO2: 97% (23 Nov 2023 23:55) (96% - 97%)    Parameters below as of 23 Nov 2023 23:55  Patient On (Oxygen Delivery Method): room air    Pt is a 41 y/o M PMHx of gastritis and previous food impaction that resolved s/p glucagon injection who presented to the ED for evaluation of globus sensation after eating [place] at 3pm, GI consulted urgently in ED. Pt is s/p 3 glucagon injections. He has been regurgitating portions of food since admission and c/o globus sensation. Denies any chest pain, abdominal pain. nausea, fevers, and chills.    In ED vitals: /87  (sinus tach) RR 18 T98F sat 96% on RA. Pt is protecting his airway. Labs: WBC increased from 7 on admission to 18.7. s/p glucagon x 3 in ED.  Currently patient is protecting airway and able to speak in full sentences. GI consulted and in the setting of leukocytosis, tachycardia, and epigastric discomfort, the team was asked to perform STAT CT. CT Neck Soft Tissue No Cont (11.23.23 @ 21:42) No acute abnormality, specifically no definite CT evidence of  pneumomediastinum or subcutaneous emphysema within the soft tissues of the neck. Please note that evaluation of the upper esophagus is limited on this exam due to motion artifact.  CT Chest No Cont (11.23.23 @ 21:43) There is food material seem at the GE junction. Food impaction possible. Left lower lobe with multifocal impaction patchy and nodular groundglass opacities likely postinfectious/inflammatory. Patient had EGD done showing Corrugated mucosa and linear furrows in the whole esophagus compatible with esophagitis. Biospy obtained, A food bolus at the distal aspect of the esophagus which was successfully pushed into the stomach. A small mucosal tear was noted at the distal aspect of the esophagus, likely corresponding to sequela of dilation of a suspected esophageal ring. Some oozing of blood was initially noted at the site of the tear. Erosions in the stomach compatible with erosive gastritis. (Biopsy). Normal mucosa in the whole examined duodenum. Patient currently tolerating soft and bite sized food. Started on oral PPI, carafate. Avoid use of NSAIDS. Finishing course of abx for PNA treatment started last week. Patient medically stable for discharge.    Food impaction   - PIP BID   - s/p EGD with removal of food impaction, gastritis  - f/u GI outpt  -c/w protonix and carafate upon discharge    Leukocytosis likely reactive  Recent CAP   - CT chest resolution of PNA  - patient is on Augmentin course. continue with Augmentin to finish the course.   - Repeat CT chest in 3 months recommended by radiology     D/C today; d/c planning took over 75 min  d/c papers edited by me  discussed d/c plan and all instructions in detail

## 2023-11-24 NOTE — DISCHARGE NOTE NURSING/CASE MANAGEMENT/SOCIAL WORK - NSDCPEFALRISK_GEN_ALL_CORE
For information on Fall & Injury Prevention, visit: [email or web address] OR  [email or web address] OR  https://www.cdc.gov/steadi/patient.html

## 2023-11-24 NOTE — PROGRESS NOTE ADULT - ASSESSMENT
Assessment and Plan  Case of a 42 year old male patient with history of gastritis and previous food impaction secondary to a chicken bolus that resolved s/p glucagon injection who presented to the ED on 11/23 for evaluation of globus sensation after eating [place] at 15:00 PM, found to be tachycardic with unremarkable blood work, to be admitted for further management. He is status post 2 IV injections of glucagon 2mg at 18:00 PM and 18:30 PM and sublingual administration at 19:00 PM with no improvement. We are consulted for concern of food impaction secondary to [place] food bolus. Status post food disimpaction.      Suspected Food Impaction Secondary to [place] Food Bolus Status Post Disimpaction  History of Food Impaction Status Post Resolution with Glucagon Administration  Likely Undiagnosed Eosinophilic Esophagitis  * Similar episode in past around 1.5 years ago: chicken food bolus s/p resolved with glucagon injections  * Recent EGD a few months ago at [place]: unremarkable per patient but no records available  * No prior history of Asthma or allergies or atopic dermatitis  * Not on blood thinners at home  * History: ate [place] (no bone or vegetable or potato) at 15:00 PM -> had sharp/pressure like epigastric discomfort that is 8/10 in intensity initially; associated with some nausea, regurgitation of food (spitting out chunks of [place]), and palpitations; vomited water after attempting to drink water; denies drooling; protecting airway; able to complete full sentences   * Exam: Airway protected, no active drooling, speaking in full sentences   * ED Vitals: /87 mmHg,  bpm (sinus tachyucardia); RR 20 bpm, T 36.9 degrees  * ED Labs: WBC 7.47, Hb 15.3, Plt 434  * INR pending    RECOMMENDATIONS  - Please keep patient NPO  - Recommend administering a third IV injection of 2mg glucagon STAT. Status post 2 IV injections of glucagon 2mg at 18:00 PM and 18:30 PM and sublingual administration at 19:00 PM with no improvement  - Will schedule patient for an urgent EGD for food disimpaction if no improvement after the 3rd injection of glucagon  - Check coagulation profile. Please correct INR to <1.5 (will need 2 FFP if INR is elevated)  - Start IV protonix 40mg BID for now      Colorectal Cancer Screening  * Family history of colon cancer in grandmother (Age?)  * Had colonoscopy in 05/2023: in [place] unremarkable per patient; no polyps or diverticulosis; asked to repeat in 10 years    RECOMMENDATIONS  - Will need repeat colonoscopy in 10 years (unless otherwise indicated)  - Would obtain outpatient records for completion of documentation in chart      Thank you for your consult.  - Please note that plan was discussed with Dr. Crawford and communicated with medical team.   - Please reach GI on [phone] during weekdays till 5pm.  - Please call the GI service line after 5pm on Weekdays and anytime on Weekends: [phone].      Marie Ahn MD  PGY - 4 Gastroenterology Fellow   [facility]

## 2023-11-24 NOTE — DISCHARGE NOTE PROVIDER - NSDCCPCAREPLAN_GEN_ALL_CORE_FT
PRINCIPAL DISCHARGE DIAGNOSIS  Diagnosis: Food impaction of esophagus  Assessment and Plan of Treatment: You were evaluated in the hospital for food impaction which occurs when food (often meat or fish bones) becomes stuck in your esophagus. You had an endoscopy done which disloged the impacted tfood bolus. You also had biopsies taken of your esophagus and stomach. Please follow up with your [facility] doctor for the results of these tests. CT findings on this admission revealed left lung findings consistent with inflammation. Please finish course of antibiotics you are on for pneumonia and repeat CT scan of chest in 3months.  After discharge, you will need to:   - Follow up with your primary care doctor within 1-2 weeks  - Follow up with your gastroenterologist, Dr Mariajose Salgado within 1 week.  - Take all the medications you were discharged with, unless otherwise instructed by your healthcare provider(s).   Please follow up with your providers by calling them to make an appointment so that you can see them in 1-2weeks; bring your paperwork from this hospital stay to that visit. You can access your visit information by signing up for an account for the patient portal at [email or web address]   Seek immediate medical attention if you develop fevers, chills, chest pain, shortness of breath, nausea and vomiting, abdominal pain, passing out, weakness or numbness or tingling on one side of your body, or any other concerning signs or symptoms.

## 2023-11-24 NOTE — DISCHARGE NOTE PROVIDER - CARE PROVIDER_API CALL
Halley Brush  Internal Medicine  [address] [place] [address]  Phone: [phone]  Fax: [phone]  Follow Up Time: 1 week    Seamus Crawford  Gastroenterology  [address] [place] [id number]  Phone: [phone]  Fax: [phone]  Follow Up Time: 1 week

## 2023-11-28 LAB
SURGICAL PATHOLOGY STUDY: SIGNIFICANT CHANGE UP
SURGICAL PATHOLOGY STUDY: SIGNIFICANT CHANGE UP

## 2023-11-29 DIAGNOSIS — T18.128A FOOD IN ESOPHAGUS CAUSING OTHER INJURY, INITIAL ENCOUNTER: ICD-10-CM

## 2023-11-29 DIAGNOSIS — K22.89 OTHER SPECIFIED DISEASE OF ESOPHAGUS: ICD-10-CM

## 2023-11-29 DIAGNOSIS — Z87.891 PERSONAL HISTORY OF NICOTINE DEPENDENCE: ICD-10-CM

## 2023-11-29 DIAGNOSIS — K29.00 ACUTE GASTRITIS WITHOUT BLEEDING: ICD-10-CM

## 2023-11-29 DIAGNOSIS — K20.0 EOSINOPHILIC ESOPHAGITIS: ICD-10-CM

## 2023-11-29 DIAGNOSIS — J18.9 PNEUMONIA, UNSPECIFIED ORGANISM: ICD-10-CM
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
## 2024-07-17 NOTE — ED PROVIDER NOTE - DATE/TIME 6
Application Tool (Optional): Liquid Nitrogen Sprayer Render Note In Bullet Format When Appropriate: No Number Of Freeze-Thaw Cycles: 1 freeze-thaw cycle Consent: The patient's consent was obtained including but not limited to risks of crusting, scabbing, blistering, scarring, darker or lighter pigmentary change, recurrence, incomplete removal and infection. Show Aperture Variable?: Yes Detail Level: Detailed Duration Of Freeze Thaw-Cycle (Seconds): 3 Post-Care Instructions: I reviewed with the patient in detail post-care instructions. Patient is to wear sunprotection, and avoid picking at any of the treated lesions. Pt may apply Vaseline to crusted or scabbing areas. 23-Nov-2023 19:58

## 2025-06-25 NOTE — ED ADULT TRIAGE NOTE - BRAND OF COVID-19 VACCINATION
Chronic, stable. Pt maintains on CPAP nightly. Follow up with sleep medicine per routine    Moderna dose 1 and 2